# Patient Record
Sex: FEMALE | Race: BLACK OR AFRICAN AMERICAN | Employment: FULL TIME | URBAN - METROPOLITAN AREA
[De-identification: names, ages, dates, MRNs, and addresses within clinical notes are randomized per-mention and may not be internally consistent; named-entity substitution may affect disease eponyms.]

---

## 2017-05-15 ENCOUNTER — OFFICE VISIT (OUTPATIENT)
Dept: FAMILY MEDICINE CLINIC | Age: 22
End: 2017-05-15

## 2017-05-15 VITALS
RESPIRATION RATE: 12 BRPM | WEIGHT: 141 LBS | HEART RATE: 94 BPM | HEIGHT: 63 IN | DIASTOLIC BLOOD PRESSURE: 68 MMHG | TEMPERATURE: 98.1 F | BODY MASS INDEX: 24.98 KG/M2 | OXYGEN SATURATION: 99 % | SYSTOLIC BLOOD PRESSURE: 100 MMHG

## 2017-05-15 DIAGNOSIS — R55 SYNCOPE, UNSPECIFIED SYNCOPE TYPE: Primary | ICD-10-CM

## 2017-05-15 NOTE — PROGRESS NOTES
Patient c/o feeling weak for a long while.  She is asking for referral to orthopedic for coccyx pain

## 2017-05-15 NOTE — PATIENT INSTRUCTIONS
Lightheadedness or Faintness: Care Instructions  Your Care Instructions  Lightheadedness is a feeling that you are about to faint or \"pass out. \" You do not feel as if you or your surroundings are moving. It is different from vertigo, which is the feeling that you or things around you are spinning or tilting. Lightheadedness usually goes away or gets better when you lie down. If lightheadedness gets worse, it can lead to a fainting spell. It is common to feel lightheaded from time to time. Lightheadedness usually is not caused by a serious problem. It often is caused by a short-lasting drop in blood pressure and blood flow to your head that occurs when you get up too quickly from a seated or lying position. Follow-up care is a key part of your treatment and safety. Be sure to make and go to all appointments, and call your doctor if you are having problems. It's also a good idea to know your test results and keep a list of the medicines you take. How can you care for yourself at home? · Lie down for 1 or 2 minutes when you feel lightheaded. After lying down, sit up slowly and remain sitting for 1 to 2 minutes before slowly standing up. · Avoid movements, positions, or activities that have made you lightheaded in the past.  · Get plenty of rest, especially if you have a cold or flu, which can cause lightheadedness. · Make sure you drink plenty of fluids, especially if you have a fever or have been sweating. · Do not drive or put yourself and others in danger while you feel lightheaded. When should you call for help? Call 911 anytime you think you may need emergency care. For example, call if:  · You have symptoms of a stroke. These may include:  ¨ Sudden numbness, tingling, weakness, or loss of movement in your face, arm, or leg, especially on only one side of your body. ¨ Sudden vision changes. ¨ Sudden trouble speaking. ¨ Sudden confusion or trouble understanding simple statements.   ¨ Sudden problems with walking or balance. ¨ A sudden, severe headache that is different from past headaches. · You have symptoms of a heart attack. These may include:  ¨ Chest pain or pressure, or a strange feeling in the chest.  ¨ Sweating. ¨ Shortness of breath. ¨ Nausea or vomiting. ¨ Pain, pressure, or a strange feeling in the back, neck, jaw, or upper belly or in one or both shoulders or arms. ¨ Lightheadedness or sudden weakness. ¨ A fast or irregular heartbeat. After you call 911, the  may tell you to chew 1 adult-strength or 2 to 4 low-dose aspirin. Wait for an ambulance. Do not try to drive yourself. Watch closely for changes in your health, and be sure to contact your doctor if:  · Your lightheadedness gets worse or does not get better with home care. Where can you learn more? Go to http://lourdes-parisa.info/. Enter B113 in the search box to learn more about \"Lightheadedness or Faintness: Care Instructions. \"  Current as of: May 27, 2016  Content Version: 11.2  © 2129-8623 [employer]. Care instructions adapted under license by [employer] (which disclaims liability or warranty for this information). If you have questions about a medical condition or this instruction, always ask your healthcare professional. [address] any warranty or liability for your use of this information.

## 2017-05-15 NOTE — PROGRESS NOTES
Lincoln Don is a 20 y.o. female  presents for establish care. She has sxs of weakness and light headed. She had syncopal episodes. She has had multiple episodes that require rest periods afterwards. No Known Allergies    There is no problem list on file for this patient. History reviewed. No pertinent past medical history. Social History     Social History    Marital status: UNKNOWN     Spouse name: N/A    Number of children: N/A    Years of education: N/A     Social History Main Topics    Smoking status: Former Smoker    Smokeless tobacco: None    Alcohol use No    Drug use: None    Sexual activity: Not Asked     Other Topics Concern    None     Social History Narrative    None     Family History   Problem Relation Age of Onset    Hypertension Mother     Heart Disease Mother     Hypertension Maternal Grandmother     Diabetes Maternal Grandfather         Review of Systems   Constitutional: Negative for chills, diaphoresis, fever and malaise/fatigue. Cardiovascular: Positive for palpitations. Negative for chest pain. Neurological: Positive for tingling and loss of consciousness. Negative for dizziness, sensory change, speech change, focal weakness, weakness and headaches. Vitals:    05/15/17 0857   BP: 100/68   Pulse: 94   Resp: 12   Temp: 98.1 °F (36.7 °C)   TempSrc: Oral   SpO2: 99%   Weight: 141 lb (64 kg)   Height: 5' 3\" (1.6 m)   PainSc:   0 - No pain   LMP: 05/08/2017       Physical Exam   Constitutional: She is oriented to person, place, and time and well-developed, well-nourished, and in no distress. Eyes: Conjunctivae are normal. Pupils are equal, round, and reactive to light. Neck: Normal range of motion. Neck supple. Cardiovascular: Normal rate, regular rhythm and normal heart sounds. Pulmonary/Chest: Effort normal and breath sounds normal.   Neurological: She is alert and oriented to person, place, and time. Gait normal.   Skin: Skin is warm and dry. Psychiatric: Mood, memory, affect and judgment normal.   Nursing note and vitals reviewed. Assessment/Plan      ICD-10-CM ICD-9-CM    1. Syncope, unspecified syncope type R55 780.2 REFERRAL TO NEUROLOGY      REFERRAL TO CARDIOLOGY      CBC WITH AUTOMATED DIFF      METABOLIC PANEL, COMPREHENSIVE      TSH 3RD GENERATION      VITAMIN D, 25 HYDROXY     I have discussed the diagnosis with the patient and the intended plan of care as seen in the above orders. The patient has received an after-visit summary and questions were answered concerning future plans. I have discussed medication, side effects, and warnings with the patient in detail. The patient verbalized understanding and is in agreement with the plan of care. The patient will contact the office with any additional concerns. Follow-up Disposition:  Return in about 3 weeks (around 6/5/2017).   lab results and schedule of future lab studies reviewed with patient    Todd Agudelo MD

## 2017-05-15 NOTE — MR AVS SNAPSHOT
Visit Information Date & Time Provider Department Dept. Phone Encounter #  
 5/15/2017  8:45 AM Luz Christianson, [address] [id number] Follow-up Instructions Return in about 3 weeks (around 6/5/2017). Upcoming Health Maintenance Date Due  
 HPV AGE 9Y-34Y (1 of 3 - Female 3 Dose Series) 5/3/2005 DTaP/Tdap/Td series (1 - Tdap) 5/3/2015 PAP AKA CERVICAL CYTOLOGY 5/3/2015 INFLUENZA AGE 9 TO ADULT 8/1/2017 Allergies as of 5/15/2017  Review Complete On: 5/15/2017 By: Luz Christianson MD  
 No Known Allergies Current Immunizations  Never Reviewed No immunizations on file. Not reviewed this visit You Were Diagnosed With   
  
 Codes Comments Syncope, unspecified syncope type    -  Primary ICD-10-CM: R55 
ICD-9-CM: 780. 2 Vitals BP Pulse Temp Resp Height(growth percentile) Weight(growth percentile) 100/68 (BP 1 Location: Left arm, BP Patient Position: Sitting) 94 98.1 °F (36.7 °C) (Oral) 12 5' 3\" (1.6 m) 141 lb (64 kg) LMP SpO2 BMI OB Status Smoking Status 05/08/2017 99% 24.98 kg/m2 Having regular periods Former Smoker Vitals History BMI and BSA Data Body Mass Index Body Surface Area 24.98 kg/m 2 1.69 m 2 Preferred Pharmacy Pharmacy Name Phone [id number] [address] [phone] Your Updated Medication List  
  
Notice  As of 5/15/2017  9:18 AM  
 You have not been prescribed any medications. We Performed the Following REFERRAL TO CARDIOLOGY [EHK12 Custom] Comments:  
 Please evaluate patient for syncopal episodes. Please do tilt table. REFERRAL TO NEUROLOGY [XKA28 Custom] Comments:  
 Please evaluate patient for possible seizures. Follow-up Instructions Return in about 3 weeks (around 6/5/2017). To-Do List   
 05/15/2017 Lab:  CBC WITH AUTOMATED DIFF   
  
 05/15/2017 Lab: METABOLIC PANEL, COMPREHENSIVE   
  
 05/15/2017 Lab:  TSH 3RD GENERATION   
  
 05/15/2017 Lab:  VITAMIN D, 25 HYDROXY Referral Information Referral ID Referred By Referred To  
  
 [id number] Praveen Lobo, [address] MD   
   [address] Ayleen Santana Phone: [phone] Fax: [phone] Visits Status Start Date End Date 1 New Request 5/15/17 5/15/18 If your referral has a status of pending review or denied, additional information will be sent to support the outcome of this decision. Referral ID Referred By Referred To  
 [id number] Praveen Lobo, [address] [place], [place] [address]e Cherelle Alas MD  
   [address] [place] [address] [address], [address] [facility]  Phone: [phone] Fax: [phone] Visits Status Start Date End Date 1 New Request 5/15/17 5/15/18 If your referral has a status of pending review or denied, additional information will be sent to support the outcome of this decision. Patient Instructions Lightheadedness or Faintness: Care Instructions Your Care Instructions Lightheadedness is a feeling that you are about to faint or \"pass out. \" You do not feel as if you or your surroundings are moving. It is different from vertigo, which is the feeling that you or things around you are spinning or tilting. Lightheadedness usually goes away or gets better when you lie down. If lightheadedness gets worse, it can lead to a fainting spell. It is common to feel lightheaded from time to time. Lightheadedness usually is not caused by a serious problem. It often is caused by a short-lasting drop in blood pressure and blood flow to your head that occurs when you get up too quickly from a seated or lying position. Follow-up care is a key part of your treatment and safety.  Be sure to make and go to all appointments, and call your doctor if you are having problems. It's also a good idea to know your test results and keep a list of the medicines you take. How can you care for yourself at home? · Lie down for 1 or 2 minutes when you feel lightheaded. After lying down, sit up slowly and remain sitting for 1 to 2 minutes before slowly standing up. · Avoid movements, positions, or activities that have made you lightheaded in the past. 
· Get plenty of rest, especially if you have a cold or flu, which can cause lightheadedness. · Make sure you drink plenty of fluids, especially if you have a fever or have been sweating. · Do not drive or put yourself and others in danger while you feel lightheaded. When should you call for help? Call 911 anytime you think you may need emergency care. For example, call if: 
· You have symptoms of a stroke. These may include: 
¨ Sudden numbness, tingling, weakness, or loss of movement in your face, arm, or leg, especially on only one side of your body. ¨ Sudden vision changes. ¨ Sudden trouble speaking. ¨ Sudden confusion or trouble understanding simple statements. ¨ Sudden problems with walking or balance. ¨ A sudden, severe headache that is different from past headaches. · You have symptoms of a heart attack. These may include: ¨ Chest pain or pressure, or a strange feeling in the chest. 
¨ Sweating. ¨ Shortness of breath. ¨ Nausea or vomiting. ¨ Pain, pressure, or a strange feeling in the back, neck, jaw, or upper belly or in one or both shoulders or arms. ¨ Lightheadedness or sudden weakness. ¨ A fast or irregular heartbeat. After you call 911, the  may tell you to chew 1 adult-strength or 2 to 4 low-dose aspirin. Wait for an ambulance. Do not try to drive yourself. Watch closely for changes in your health, and be sure to contact your doctor if: 
· Your lightheadedness gets worse or does not get better with home care. Where can you learn more? Go to http://lourdes-parisa.info/. Enter A312 in the search box to learn more about \"Lightheadedness or Faintness: Care Instructions. \" Current as of: May 27, 2016 Content Version: 11.2 © 4182-1617 [employer]. Care instructions adapted under license by [employer] (which disclaims liability or warranty for this information). If you have questions about a medical condition or this instruction, always ask your healthcare professional. [place] [address] any warranty or liability for your use of this information. Introducing [facility]! [facility] introduces Javelin patient portal. Now you can access parts of your medical record, email your doctor's office, and request medication refills online. 1. In your internet browser, go to [email or web address] 2. Click on the First Time User? Click Here link in the Sign In box. You will see the New Member Sign Up page. 3. Enter your Javelin Access Code exactly as it appears below. You will not need to use this code after youve completed the sign-up process. If you do not sign up before the expiration date, you must request a new code. · Javelin Access Code: [email or web address] Expires: 8/13/2017  9:18 AM 
 
4. Enter the last four digits of your Social Security Number (xxxx) and Date of Birth (mm/dd/yyyy) as indicated and click Submit. You will be taken to the next sign-up page. 5. Create a Javelin ID. This will be your Javelin login ID and cannot be changed, so think of one that is secure and easy to remember. 6. Create a Javelin password. You can change your password at any time. 7. Enter your Password Reset Question and Answer. This can be used at a later time if you forget your password. 8. Enter your e-mail address. You will receive e-mail notification when new information is available in [address] 9. Click Sign Up. You can now view and download portions of your medical record. 10. Click the Download Summary menu link to download a portable copy of your medical information. If you have questions, please visit the Frequently Asked Questions section of the Doctor Evidence website. Remember, Doctor Evidence is NOT to be used for urgent needs. For medical emergencies, dial 911. Now available from your iPhone and Android! Please provide this summary of care documentation to your next provider. If you have any questions after today's visit, please call [phone].

## 2017-05-16 LAB
25(OH)D3+25(OH)D2 SERPL-MCNC: 21.4 NG/ML (ref 30–100)
ALBUMIN SERPL-MCNC: 4.2 G/DL (ref 3.5–5.5)
ALBUMIN/GLOB SERPL: 1.3 {RATIO} (ref 1.2–2.2)
ALP SERPL-CCNC: 46 IU/L (ref 39–117)
ALT SERPL-CCNC: 12 IU/L (ref 0–32)
AST SERPL-CCNC: 26 IU/L (ref 0–40)
BASOPHILS # BLD AUTO: 0 X10E3/UL (ref 0–0.2)
BASOPHILS NFR BLD AUTO: 1 %
BILIRUB SERPL-MCNC: 0.3 MG/DL (ref 0–1.2)
BUN SERPL-MCNC: 10 MG/DL (ref 6–20)
BUN/CREAT SERPL: 13 (ref 9–23)
CALCIUM SERPL-MCNC: 9.6 MG/DL (ref 8.7–10.2)
CHLORIDE SERPL-SCNC: 102 MMOL/L (ref 96–106)
CO2 SERPL-SCNC: 25 MMOL/L (ref 18–29)
CREAT SERPL-MCNC: 0.78 MG/DL (ref 0.57–1)
EOSINOPHIL # BLD AUTO: 0.1 X10E3/UL (ref 0–0.4)
EOSINOPHIL NFR BLD AUTO: 1 %
ERYTHROCYTE [DISTWIDTH] IN BLOOD BY AUTOMATED COUNT: 13.8 % (ref 12.3–15.4)
GLOBULIN SER CALC-MCNC: 3.2 G/DL (ref 1.5–4.5)
GLUCOSE SERPL-MCNC: 60 MG/DL (ref 65–99)
HCT VFR BLD AUTO: 39.3 % (ref 34–46.6)
HGB BLD-MCNC: 12.8 G/DL (ref 11.1–15.9)
IMM GRANULOCYTES # BLD: 0 X10E3/UL (ref 0–0.1)
IMM GRANULOCYTES NFR BLD: 0 %
LYMPHOCYTES # BLD AUTO: 1.9 X10E3/UL (ref 0.7–3.1)
LYMPHOCYTES NFR BLD AUTO: 29 %
MCH RBC QN AUTO: 30.5 PG (ref 26.6–33)
MCHC RBC AUTO-ENTMCNC: 32.6 G/DL (ref 31.5–35.7)
MCV RBC AUTO: 94 FL (ref 79–97)
MONOCYTES # BLD AUTO: 0.4 X10E3/UL (ref 0.1–0.9)
MONOCYTES NFR BLD AUTO: 5 %
NEUTROPHILS # BLD AUTO: 4.2 X10E3/UL (ref 1.4–7)
NEUTROPHILS NFR BLD AUTO: 64 %
PLATELET # BLD AUTO: 264 X10E3/UL (ref 150–379)
POTASSIUM SERPL-SCNC: 4.9 MMOL/L (ref 3.5–5.2)
PROT SERPL-MCNC: 7.4 G/DL (ref 6–8.5)
RBC # BLD AUTO: 4.2 X10E6/UL (ref 3.77–5.28)
SODIUM SERPL-SCNC: 141 MMOL/L (ref 134–144)
TSH SERPL DL<=0.005 MIU/L-ACNC: 2.81 UIU/ML (ref 0.45–4.5)
WBC # BLD AUTO: 6.6 X10E3/UL (ref 3.4–10.8)

## 2017-06-19 ENCOUNTER — OFFICE VISIT (OUTPATIENT)
Dept: NEUROLOGY | Age: 22
End: 2017-06-19

## 2017-06-19 VITALS
BODY MASS INDEX: 24.49 KG/M2 | HEIGHT: 63 IN | WEIGHT: 138.2 LBS | TEMPERATURE: 99 F | RESPIRATION RATE: 12 BRPM | SYSTOLIC BLOOD PRESSURE: 112 MMHG | HEART RATE: 84 BPM | OXYGEN SATURATION: 99 % | DIASTOLIC BLOOD PRESSURE: 76 MMHG

## 2017-06-19 DIAGNOSIS — G40.209 PARTIAL SYMPTOMATIC EPILEPSY WITH COMPLEX PARTIAL SEIZURES, NOT INTRACTABLE, WITHOUT STATUS EPILEPTICUS (HCC): Primary | ICD-10-CM

## 2017-06-19 DIAGNOSIS — G43.019 INTRACTABLE MIGRAINE WITHOUT AURA AND WITHOUT STATUS MIGRAINOSUS: ICD-10-CM

## 2017-06-19 DIAGNOSIS — G40.209 PARTIAL SYMPTOMATIC EPILEPSY WITH COMPLEX PARTIAL SEIZURES, NOT INTRACTABLE, WITHOUT STATUS EPILEPTICUS (HCC): ICD-10-CM

## 2017-06-19 NOTE — LETTER
6/19/2017 9:12 AM 
 
Patient:  Dorie Montero YOB: 1995 Date of Visit: 6/19/2017 Dear Verena Gaitan MD 
[address] [place] [address] [address] [place] [id number] VIA In Basket 
 : Thank you for referring Ms. Dorie Montero to me for evaluation/treatment. Below are the relevant portions of my assessment and plan of care. Dorie Montero is a 20 y.o., left handed female, with no past medical history, who has been having spells of tingling of her entire body and then becomes faint. She says that a typical trigger would be getting too hot, or exposure to significant odors. She's had at least one episode in a nail salon where she felt lightheaded and then just stops being able to move. She then develops tingling of her body. Soon there after she becomes poorly responsive and just lies on the floor. She's not aware she's had any seizure like activity, but a nurse that witnessed the event suggested she may have had a seizure. She's had 5 events that she's aware of in her life. After she's done with the event she feels very tired, as if she can sleep the rest of the day. These spells started when she was about 24years old. She's had 4 events in the last year. One of the events did not involve any trigger. There's no seizure history in the family. The patient's never had a seizure, even as an infant. She's been getting headaches daily since moving to this region in October. She describes a varying headache, some days it just a mild pressure sensation, other days they can be a throbbing bitemporal headache. There's some nausea but no vomiting. She rarely if ever got headaches when living in [place]. Social History; she's single, lives with her sister. She doesn't smoke, drink nor use illicit drugs. Works in retail. Family History; mother with hypertension and heart disease. Father with no history. Siblings with no health issues. No past medical history on file. No past surgical history on file. No Known Allergies There is no problem list on file for this patient. Review of Systems: As above otherwise 11 point review of systems negative including;  
Constitutional no fever or chills Skin denies rash or itching HENT  Denies tinnitus, hearing lose Eyes denies diplopia vision lose Respiratory denies shortness of breath Cardiovascular denies chest pain, dyspnea on exertion Gastrointestinal denies nausea, vomiting, diarrhea, constipation Genitourinary denies incontinence Musculoskeletal denies joint pain or swelling Endocrine denies weight change Hematology denies easy bruising or bleeding Neurological as above in HPI PHYSICAL EXAMINATION:   
 
VITAL SIGNS:   
Visit Vitals  /76 (BP 1 Location: Right arm, BP Patient Position: Standing)  Pulse 84  Temp 99 °F (37.2 °C) (Oral)  Resp 12  Ht 5' 3\" (1.6 m)  Wt 62.7 kg (138 lb 3.2 oz)  LMP 06/05/2017 (Exact Date)  SpO2 99%  BMI 24.48 kg/m2 BP laying was 112/78 pulse was 62 BP standing was 112/76 pulse was 84 GENERAL: The patient is well developed, well nourished, and in no apparent distress. EXTREMITIES: No clubbing, cyanosis, or edema is identified. Pulses 2+ and symmetrical.  Muscle tone is normal. 
HEAD:   Ear, nose, and throat appear to be without trauma. The patient is normocephalic. NEUROLOGIC EXAMINATION 
 
MENTAL STATUS: The patient is awake, alert, and oriented x 4. Fund of knowledge is adequate. Speech is fluent and memory appears to be intact, both long and short term. CRANIAL NERVES: II  Visual fields are full to confrontation. Funduscopic examination reveals flat disks bilaterally. Pupils are both 5 mm and briskly reactive to light and accommodation. III, IV, VI  Extraocular movements are intact and there is no nystagmus. V  Facial sensation is intact to pinprick and light touch. VII  Face is symmetrical.  
VIII - Hearing is present. IX, X, [address] rises symmetrically. Gag is present. Tongue is in the midline. XI - Shoulder shrugging and head turning intact MOTOR:  The patient is 5/5 in all four limbs without any drift. Fine finger movements are symmetrical.  Isolated motor group testing reveals no focal abnormalities. Tone is normal.  Sensory examination is intact to pinprick, light touch and position sense testing. Reflexes are 2+ and symmetrical. Plantars are down going. Cerebellar examination reveals no gross ataxia or dysmetria. Gait is normal and the patient can tandem walk without any difficulty. CBC:  
Lab Results Component Value Date/Time WBC 6.6 05/15/2017 09:18 AM  
 RBC 4.20 05/15/2017 09:18 AM  
 HGB 12.8 05/15/2017 09:18 AM  
 HCT 39.3 05/15/2017 09:18 AM  
 PLATELET 085 08/33/3372 09:18 AM  
 
BMP:  
Lab Results Component Value Date/Time Glucose 60 05/15/2017 09:18 AM  
 Sodium 141 05/15/2017 09:18 AM  
 Potassium 4.9 05/15/2017 09:18 AM  
 Chloride 102 05/15/2017 09:18 AM  
 CO2 25 05/15/2017 09:18 AM  
 BUN 10 05/15/2017 09:18 AM  
 Creatinine 0.78 05/15/2017 09:18 AM  
 Calcium 9.6 05/15/2017 09:18 AM  
 
CMP:  
Lab Results Component Value Date/Time Glucose 60 05/15/2017 09:18 AM  
 Sodium 141 05/15/2017 09:18 AM  
 Potassium 4.9 05/15/2017 09:18 AM  
 Chloride 102 05/15/2017 09:18 AM  
 CO2 25 05/15/2017 09:18 AM  
 BUN 10 05/15/2017 09:18 AM  
 Creatinine 0.78 05/15/2017 09:18 AM  
 Calcium 9.6 05/15/2017 09:18 AM  
 BUN/Creatinine ratio 13 05/15/2017 09:18 AM  
 Alk. phosphatase 46 05/15/2017 09:18 AM  
 Protein, total 7.4 05/15/2017 09:18 AM  
 Albumin 4.2 05/15/2017 09:18 AM  
 A-G Ratio 1.3 05/15/2017 09:18 AM  
 
Coagulation: No results found for: PTP, INR, APTT, PTTT Cardiac markers: No results found for: CPK, CKND1, NICKY Impression: New onset of what sounds like complex partial seizures in this patient who has risk factors including none. She has no past history and no family history. She has a normal exam.  She also has recent onset of headaches since moving into the [address] Plan: Needs an MRI brain and an EEG for new onset of what sounds like seizures. Will determine what anti-convulsant to use after the EEG is obtained. Return here in about 3 weeks. In terms of the headaches, would likely pick an anti-convulsant that will also help with the headaches. If you have questions, please do not hesitate to call me. I look forward to following Ms. Gio Ruggiero along with you.  
 
 
 
Sincerely, 
 
 
Juan Pratt MD

## 2017-06-19 NOTE — PROGRESS NOTES
Sameer Forman is a 20 y.o., left handed female, with no past medical history, who has been having spells of tingling of her entire body and then becomes faint. She says that a typical trigger would be getting too hot, or exposure to significant odors. She's had at least one episode in a nail salon where she felt lightheaded and then just stops being able to move. She then develops tingling of her body. Soon there after she becomes poorly responsive and just lies on the floor. She's not aware she's had any seizure like activity, but a nurse that witnessed the event suggested she may have had a seizure. She's had 5 events that she's aware of in her life. After she's done with the event she feels very tired, as if she can sleep the rest of the day. These spells started when she was about 24years old. She's had 4 events in the last year. One of the events did not involve any trigger. There's no seizure history in the family. The patient's never had a seizure, even as an infant. She's been getting headaches daily since moving to this region in October. She describes a varying headache, some days it just a mild pressure sensation, other days they can be a throbbing bitemporal headache. There's some nausea but no vomiting. She rarely if ever got headaches when living in [place]. Social History; she's single, lives with her sister. She doesn't smoke, drink nor use illicit drugs. Works in retail. Family History; mother with hypertension and heart disease. Father with no history. Siblings with no health issues. No past medical history on file. No past surgical history on file. No Known Allergies    There is no problem list on file for this patient.         Review of Systems:   As above otherwise 11 point review of systems negative including;   Constitutional no fever or chills  Skin denies rash or itching  HENT  Denies tinnitus, hearing lose  Eyes denies diplopia vision lose  Respiratory denies shortness of breath  Cardiovascular denies chest pain, dyspnea on exertion  Gastrointestinal denies nausea, vomiting, diarrhea, constipation  Genitourinary denies incontinence  Musculoskeletal denies joint pain or swelling  Endocrine denies weight change  Hematology denies easy bruising or bleeding   Neurological as above in HPI      PHYSICAL EXAMINATION:      VITAL SIGNS:    Visit Vitals    /76 (BP 1 Location: Right arm, BP Patient Position: Standing)    Pulse 84    Temp 99 °F (37.2 °C) (Oral)    Resp 12    Ht 5' 3\" (1.6 m)    Wt 62.7 kg (138 lb 3.2 oz)    LMP 06/05/2017 (Exact Date)    SpO2 99%    BMI 24.48 kg/m2     BP laying was 112/78 pulse was 62  BP standing was 112/76 pulse was 84    GENERAL: The patient is well developed, well nourished, and in no apparent distress. EXTREMITIES: No clubbing, cyanosis, or edema is identified. Pulses 2+ and symmetrical.  Muscle tone is normal.  HEAD:   Ear, nose, and throat appear to be without trauma. The patient is normocephalic. NEUROLOGIC EXAMINATION    MENTAL STATUS: The patient is awake, alert, and oriented x 4. Fund of knowledge is adequate. Speech is fluent and memory appears to be intact, both long and short term. CRANIAL NERVES: II - Visual fields are full to confrontation. Funduscopic examination reveals flat disks bilaterally. Pupils are both 5 mm and briskly reactive to light and accommodation. III, IV, VI - Extraocular movements are intact and there is no nystagmus. V - Facial sensation is intact to pinprick and light touch. VII - Face is symmetrical.   VIII - Hearing is present. IX, X, XII- Palate rises symmetrically. Gag is present. Tongue is in the midline. XI - Shoulder shrugging and head turning intact  MOTOR:  The patient is 5/5 in all four limbs without any drift. Fine finger movements are symmetrical.  Isolated motor group testing reveals no focal abnormalities.   Tone is normal.  Sensory examination is intact to pinprick, light touch and position sense testing. Reflexes are 2+ and symmetrical. Plantars are down going. Cerebellar examination reveals no gross ataxia or dysmetria. Gait is normal and the patient can tandem walk without any difficulty. CBC:   Lab Results   Component Value Date/Time    WBC 6.6 05/15/2017 09:18 AM    RBC 4.20 05/15/2017 09:18 AM    HGB 12.8 05/15/2017 09:18 AM    HCT 39.3 05/15/2017 09:18 AM    PLATELET 283 08/28/3339 09:18 AM     BMP:   Lab Results   Component Value Date/Time    Glucose 60 05/15/2017 09:18 AM    Sodium 141 05/15/2017 09:18 AM    Potassium 4.9 05/15/2017 09:18 AM    Chloride 102 05/15/2017 09:18 AM    CO2 25 05/15/2017 09:18 AM    BUN 10 05/15/2017 09:18 AM    Creatinine 0.78 05/15/2017 09:18 AM    Calcium 9.6 05/15/2017 09:18 AM     CMP:   Lab Results   Component Value Date/Time    Glucose 60 05/15/2017 09:18 AM    Sodium 141 05/15/2017 09:18 AM    Potassium 4.9 05/15/2017 09:18 AM    Chloride 102 05/15/2017 09:18 AM    CO2 25 05/15/2017 09:18 AM    BUN 10 05/15/2017 09:18 AM    Creatinine 0.78 05/15/2017 09:18 AM    Calcium 9.6 05/15/2017 09:18 AM    BUN/Creatinine ratio 13 05/15/2017 09:18 AM    Alk. phosphatase 46 05/15/2017 09:18 AM    Protein, total 7.4 05/15/2017 09:18 AM    Albumin 4.2 05/15/2017 09:18 AM    A-G Ratio 1.3 05/15/2017 09:18 AM     Coagulation: No results found for: PTP, INR, APTT, PTTT  Cardiac markers: No results found for: CPK, CKND1, NICKY       Impression: New onset of what sounds like complex partial seizures in this patient who has risk factors including none. She has no past history and no family history. She has a normal exam.  She also has recent onset of headaches since moving into the [facility]. Plan: Needs an MRI brain and an EEG for new onset of what sounds like seizures. Will determine what anti-convulsant to use after the EEG is obtained. Return here in about 3 weeks.   In terms of the headaches, would likely pick an anti-convulsant that will also help with the headaches.

## 2017-06-19 NOTE — COMMUNICATION BODY
Christa Masterson is a 20 y.o., left handed female, with no past medical history, who has been having spells of tingling of her entire body and then becomes faint. She says that a typical trigger would be getting too hot, or exposure to significant odors. She's had at least one episode in a nail salon where she felt lightheaded and then just stops being able to move. She then develops tingling of her body. Soon there after she becomes poorly responsive and just lies on the floor. She's not aware she's had any seizure like activity, but a nurse that witnessed the event suggested she may have had a seizure. She's had 5 events that she's aware of in her life. After she's done with the event she feels very tired, as if she can sleep the rest of the day. These spells started when she was about 24years old. She's had 4 events in the last year. One of the events did not involve any trigger. There's no seizure history in the family. The patient's never had a seizure, even as an infant. She's been getting headaches daily since moving to this region in October. She describes a varying headache, some days it just a mild pressure sensation, other days they can be a throbbing bitemporal headache. There's some nausea but no vomiting. She rarely if ever got headaches when living in [place]. Social History; she's single, lives with her sister. She doesn't smoke, drink nor use illicit drugs. Works in retail. Family History; mother with hypertension and heart disease. Father with no history. Siblings with no health issues. No past medical history on file. No past surgical history on file. No Known Allergies    There is no problem list on file for this patient.         Review of Systems:   As above otherwise 11 point review of systems negative including;   Constitutional no fever or chills  Skin denies rash or itching  HENT  Denies tinnitus, hearing lose  Eyes denies diplopia vision lose  Respiratory denies shortness of breath  Cardiovascular denies chest pain, dyspnea on exertion  Gastrointestinal denies nausea, vomiting, diarrhea, constipation  Genitourinary denies incontinence  Musculoskeletal denies joint pain or swelling  Endocrine denies weight change  Hematology denies easy bruising or bleeding   Neurological as above in HPI      PHYSICAL EXAMINATION:      VITAL SIGNS:    Visit Vitals    /76 (BP 1 Location: Right arm, BP Patient Position: Standing)    Pulse 84    Temp 99 °F (37.2 °C) (Oral)    Resp 12    Ht 5' 3\" (1.6 m)    Wt 62.7 kg (138 lb 3.2 oz)    LMP 06/05/2017 (Exact Date)    SpO2 99%    BMI 24.48 kg/m2     BP laying was 112/78 pulse was 62  BP standing was 112/76 pulse was 84    GENERAL: The patient is well developed, well nourished, and in no apparent distress. EXTREMITIES: No clubbing, cyanosis, or edema is identified. Pulses 2+ and symmetrical.  Muscle tone is normal.  HEAD:   Ear, nose, and throat appear to be without trauma. The patient is normocephalic. NEUROLOGIC EXAMINATION    MENTAL STATUS: The patient is awake, alert, and oriented x 4. Fund of knowledge is adequate. Speech is fluent and memory appears to be intact, both long and short term. CRANIAL NERVES: II - Visual fields are full to confrontation. Funduscopic examination reveals flat disks bilaterally. Pupils are both 5 mm and briskly reactive to light and accommodation. III, IV, VI - Extraocular movements are intact and there is no nystagmus. V - Facial sensation is intact to pinprick and light touch. VII - Face is symmetrical.   VIII - Hearing is present. IX, X, XII- Palate rises symmetrically. Gag is present. Tongue is in the midline. XI - Shoulder shrugging and head turning intact  MOTOR:  The patient is 5/5 in all four limbs without any drift. Fine finger movements are symmetrical.  Isolated motor group testing reveals no focal abnormalities.   Tone is normal.  Sensory examination is intact to pinprick, light touch and position sense testing. Reflexes are 2+ and symmetrical. Plantars are down going. Cerebellar examination reveals no gross ataxia or dysmetria. Gait is normal and the patient can tandem walk without any difficulty. CBC:   Lab Results   Component Value Date/Time    WBC 6.6 05/15/2017 09:18 AM    RBC 4.20 05/15/2017 09:18 AM    HGB 12.8 05/15/2017 09:18 AM    HCT 39.3 05/15/2017 09:18 AM    PLATELET 601 41/64/9468 09:18 AM     BMP:   Lab Results   Component Value Date/Time    Glucose 60 05/15/2017 09:18 AM    Sodium 141 05/15/2017 09:18 AM    Potassium 4.9 05/15/2017 09:18 AM    Chloride 102 05/15/2017 09:18 AM    CO2 25 05/15/2017 09:18 AM    BUN 10 05/15/2017 09:18 AM    Creatinine 0.78 05/15/2017 09:18 AM    Calcium 9.6 05/15/2017 09:18 AM     CMP:   Lab Results   Component Value Date/Time    Glucose 60 05/15/2017 09:18 AM    Sodium 141 05/15/2017 09:18 AM    Potassium 4.9 05/15/2017 09:18 AM    Chloride 102 05/15/2017 09:18 AM    CO2 25 05/15/2017 09:18 AM    BUN 10 05/15/2017 09:18 AM    Creatinine 0.78 05/15/2017 09:18 AM    Calcium 9.6 05/15/2017 09:18 AM    BUN/Creatinine ratio 13 05/15/2017 09:18 AM    Alk. phosphatase 46 05/15/2017 09:18 AM    Protein, total 7.4 05/15/2017 09:18 AM    Albumin 4.2 05/15/2017 09:18 AM    A-G Ratio 1.3 05/15/2017 09:18 AM     Coagulation: No results found for: PTP, INR, APTT, PTTT  Cardiac markers: No results found for: CPK, CKND1, NICKY       Impression: New onset of what sounds like complex partial seizures in this patient who has risk factors including none. She has no past history and no family history. She has a normal exam.  She also has recent onset of headaches since moving into the [facility]. Plan: Needs an MRI brain and an EEG for new onset of what sounds like seizures. Will determine what anti-convulsant to use after the EEG is obtained. Return here in about 3 weeks.   In terms of the headaches, would likely pick an anti-convulsant that will also help with the headaches.

## 2017-06-19 NOTE — MR AVS SNAPSHOT
Visit Information Date & Time Provider Department Dept. Phone Encounter #  
 6/19/2017  8:00 AM Michelle Aguilar, [address] [id number] Follow-up Instructions Return in about 3 weeks (around 7/10/2017). Follow-up and Disposition History Upcoming Health Maintenance Date Due  
 HPV AGE 9Y-34Y (1 of 3 - Female 3 Dose Series) 5/3/2005 DTaP/Tdap/Td series (1 - Tdap) 5/3/2015 PAP AKA CERVICAL CYTOLOGY 5/3/2015 INFLUENZA AGE 9 TO ADULT 8/1/2017 Allergies as of 6/19/2017  Review Complete On: 6/19/2017 By: Jack Garcia LPN No Known Allergies Current Immunizations  Never Reviewed No immunizations on file. Not reviewed this visit You Were Diagnosed With   
  
 Codes Comments Partial symptomatic epilepsy with complex partial seizures, not intractable, without status epilepticus ([facility]    -  Primary ICD-10-CM: D71.095 ICD-9-CM: 345.40 Intractable migraine without aura and without status migrainosus     ICD-10-CM: G43.019 
ICD-9-CM: 346.11 Vitals BP Pulse Temp Resp Height(growth percentile) Weight(growth percentile) 112/76 (BP 1 Location: Right arm, BP Patient Position: Standing) 84 99 °F (37.2 °C) (Oral) 12 5' 3\" (1.6 m) 138 lb 3.2 oz (62.7 kg) LMP SpO2 BMI OB Status Smoking Status 06/05/2017 (Exact Date) 99% 24.48 kg/m2 Having regular periods Former Smoker Vitals History BMI and BSA Data Body Mass Index Body Surface Area  
 24.48 kg/m 2 1.67 m 2 Preferred Pharmacy Pharmacy Name Phone [id number], [address] [place] [phone] Your Updated Medication List  
  
Notice  As of 6/19/2017  9:33 AM  
 You have not been prescribed any medications. Follow-up Instructions Return in about 3 weeks (around 7/10/2017). To-Do List   
 06/19/2017 Lab:  CREATININE   
  
 06/19/2017   Neurology:  EEG AWAKE AND ASLEEP   
  
 06/19/2017 Imaging:  MRI BRAIN W WO CONT Patient Instructions Epilepsy: Care Instructions Your Care Instructions Epilepsy is a common condition that causes repeated seizures. The seizures are caused by bursts of electrical activity in the brain that aren't normal. Seizures may cause problems with muscle control, movement, speech, vision, or awareness. They can be scary. Epilepsy affects each person differently. Some people have only a few seizures. Others get them more often. If you know what triggers a seizure, you may be able to avoid having one. You can take medicines to control and reduce seizures. You and your doctor will need to find the right combination, schedule, and dose of medicine. This may take time and careful changes. Seizures may get worse and happen more often over time. Follow-up care is a key part of your treatment and safety. Be sure to make and go to all appointments, and call your doctor if you are having problems. It's also a good idea to know your test results and keep a list of the medicines you take. How can you care for yourself at home? · Be safe with medicines. Take your medicines exactly as prescribed. Call your doctor if you think you are having a problem with your medicine. · Make a treatment plan with your doctor. Be sure to follow your plan. · Try to identify and avoid things that may make you more likely to have a seizure. These may include: ¨ Not getting enough sleep. ¨ Using drugs or alcohol. ¨ Being emotionally stressed. ¨ Skipping meals. · Keep a record of any seizures you have. Note the date, time of day, and any details about the seizure that you can remember. Your doctor can use this information to plan or adjust your medicine or other treatment. · Be sure that any doctor treating you for another condition knows that you have epilepsy. Each doctor should know what medicines you are taking, if any. · Wear a medical ID bracelet. You can buy this at most [employer]. If you have a seizure that leaves you unconscious or unable to speak for yourself, this bracelet will let those who are treating you know that you have epilepsy. · Talk to your doctor about whether it is safe for you to do certain activities, such as drive or swim. When should you call for help? Call 911 anytime you think you may need emergency care. For example, call if: · A seizure does not stop as it normally does. · You have new symptoms such as: 
¨ Numbness, tingling, or weakness on one side of your body or face. ¨ Vision changes. ¨ Trouble speaking or thinking clearly. Call your doctor now or seek immediate medical care if: 
· You have a fever. · You have a severe headache. Watch closely for changes in your health, and be sure to contact your doctor if: · The normal pattern or features of your seizures change. Where can you learn more? Go to http://lourdes-parisa.info/. Irma Souza in the search box to learn more about \"Epilepsy: Care Instructions. \" Current as of: October 14, 2016 Content Version: 11.2 © 3090-3361 [employer]. Care instructions adapted under license by [employer] (which disclaims liability or warranty for this information). If you have questions about a medical condition or this instruction, always ask your healthcare professional. [place] [address] any warranty or liability for your use of this information. Introducing \[facility]\"" & HEALTH SERVICES! Dear Enrike: Thank you for requesting a [employer] account. Our records indicate that you already have an active [employer] account. You can access your account anytime at [email or web address] Did you know that you can access your hospital and ER discharge instructions at any time in [employer]? You can also review all of your test results from your hospital stay or ER visit. Additional Information If you have questions, please visit the Frequently Asked Questions section of the [employer] website at [email or web address] Remember, Page[employer] is NOT to be used for urgent needs. For medical emergencies, dial 911. Now available from your iPhone and Android! Please provide this summary of care documentation to your next provider. Your primary care clinician is listed as [id number] [facility]. If you have any questions after today's visit, please call [phone].

## 2017-06-19 NOTE — PATIENT INSTRUCTIONS
Epilepsy: Care Instructions  Your Care Instructions  Epilepsy is a common condition that causes repeated seizures. The seizures are caused by bursts of electrical activity in the brain that aren't normal. Seizures may cause problems with muscle control, movement, speech, vision, or awareness. They can be scary. Epilepsy affects each person differently. Some people have only a few seizures. Others get them more often. If you know what triggers a seizure, you may be able to avoid having one. You can take medicines to control and reduce seizures. You and your doctor will need to find the right combination, schedule, and dose of medicine. This may take time and careful changes. Seizures may get worse and happen more often over time. Follow-up care is a key part of your treatment and safety. Be sure to make and go to all appointments, and call your doctor if you are having problems. It's also a good idea to know your test results and keep a list of the medicines you take. How can you care for yourself at home? · Be safe with medicines. Take your medicines exactly as prescribed. Call your doctor if you think you are having a problem with your medicine. · Make a treatment plan with your doctor. Be sure to follow your plan. · Try to identify and avoid things that may make you more likely to have a seizure. These may include:  ¨ Not getting enough sleep. ¨ Using drugs or alcohol. ¨ Being emotionally stressed. ¨ Skipping meals. · Keep a record of any seizures you have. Note the date, time of day, and any details about the seizure that you can remember. Your doctor can use this information to plan or adjust your medicine or other treatment. · Be sure that any doctor treating you for another condition knows that you have epilepsy. Each doctor should know what medicines you are taking, if any. · Wear a medical ID bracelet. You can buy this at most [employer].  If you have a seizure that leaves you unconscious or unable to speak for yourself, this bracelet will let those who are treating you know that you have epilepsy. · Talk to your doctor about whether it is safe for you to do certain activities, such as drive or swim. When should you call for help? Call 911 anytime you think you may need emergency care. For example, call if:  · A seizure does not stop as it normally does. · You have new symptoms such as:  ¨ Numbness, tingling, or weakness on one side of your body or face. ¨ Vision changes. ¨ Trouble speaking or thinking clearly. Call your doctor now or seek immediate medical care if:  · You have a fever. · You have a severe headache. Watch closely for changes in your health, and be sure to contact your doctor if:  · The normal pattern or features of your seizures change. Where can you learn more? Go to http://lourdes-parisa.info/. Enrike Pineda in the search box to learn more about \"Epilepsy: Care Instructions. \"  Current as of: October 14, 2016  Content Version: 11.2  © 5638-5890 [employer]. Care instructions adapted under license by [employer] (which disclaims liability or warranty for this information). If you have questions about a medical condition or this instruction, always ask your healthcare professional. [address] any warranty or liability for your use of this information.

## 2017-07-06 ENCOUNTER — TELEPHONE (OUTPATIENT)
Dept: FAMILY MEDICINE CLINIC | Age: 22
End: 2017-07-06

## 2017-07-06 ENCOUNTER — HOSPITAL ENCOUNTER (OUTPATIENT)
Dept: MRI IMAGING | Age: 22
Discharge: HOME | End: 2017-07-06
Attending: PSYCHIATRY & NEUROLOGY
Payer: COMMERCIAL

## 2017-07-06 DIAGNOSIS — G43.019 INTRACTABLE MIGRAINE WITHOUT AURA AND WITHOUT STATUS MIGRAINOSUS: ICD-10-CM

## 2017-07-06 DIAGNOSIS — G40.209 PARTIAL SYMPTOMATIC EPILEPSY WITH COMPLEX PARTIAL SEIZURES, NOT INTRACTABLE, WITHOUT STATUS EPILEPTICUS (HCC): ICD-10-CM

## 2017-07-06 PROCEDURE — 74011250636 HC RX REV CODE- 250/636: Performed by: PSYCHIATRY & NEUROLOGY

## 2017-07-06 PROCEDURE — A9585 GADOBUTROL INJECTION: HCPCS | Performed by: PSYCHIATRY & NEUROLOGY

## 2017-07-06 PROCEDURE — 70553 MRI BRAIN STEM W/O & W/DYE: CPT

## 2017-07-06 RX ADMIN — GADOBUTROL 7.5 ML: 604.72 INJECTION INTRAVENOUS at 09:42

## 2017-07-06 NOTE — TELEPHONE ENCOUNTER
Called patient back no answer left message asking her to call the office back, she does not need referrals for her insurance has open access policy, she was scheduled with cardiology on 5/31/17 at 10:00 AM and no showed that appointment according to referral tracking notes.

## 2017-07-06 NOTE — TELEPHONE ENCOUNTER
Ms. Rudolph Schulz called requesting an insurance referral to the cardiologist, Dr. Henok Schneider. She said Dr. Monse Cool referred her and she hasn't heard anything.      [facility]   [address] [place] [address], [address] [place] [address]     [phone] Phone  [phone] Fax

## 2017-07-10 ENCOUNTER — HOSPITAL ENCOUNTER (OUTPATIENT)
Dept: NEUROLOGY | Age: 22
Discharge: HOME | End: 2017-07-10
Attending: PSYCHIATRY & NEUROLOGY
Payer: COMMERCIAL

## 2017-07-10 DIAGNOSIS — G43.019 INTRACTABLE MIGRAINE WITHOUT AURA AND WITHOUT STATUS MIGRAINOSUS: ICD-10-CM

## 2017-07-10 DIAGNOSIS — G40.209 PARTIAL SYMPTOMATIC EPILEPSY WITH COMPLEX PARTIAL SEIZURES, NOT INTRACTABLE, WITHOUT STATUS EPILEPTICUS (HCC): ICD-10-CM

## 2017-07-10 PROCEDURE — 95819 EEG AWAKE AND ASLEEP: CPT

## 2017-07-10 NOTE — TELEPHONE ENCOUNTER
Called patient no answer left message letting patient know I was calling her in reference to cardiology referral asked that she call the office back, office number has been left.

## 2017-07-12 NOTE — TELEPHONE ENCOUNTER
Called patient to discuss her referral no answer left message letting patient know I was returning call to discuss her Cardiology referral no answer left message letting her know I was again returning her call to discuss this referral told to call the office back with any questions, office number has been left.

## 2017-07-12 NOTE — TELEPHONE ENCOUNTER
Patient called the office back had her verify her  she has been made aware that her insurance is an open access policy and does not require insurance referrals she was also made aware she was scheduled with Cardiology on 17 and no showed that appointment she states she did not no show the appointment she cancelled it. Told patient I will re fax a consult request to Dr. Farshad Pepe office so someone can contact her to set up another appointment she has expressed understanding and agrees with this plan consult request has been faxed.

## 2017-07-12 NOTE — PROCEDURES
[facility]    Name:  Harley Lombard  MR#:  [id number]  :  1995  Account #:  [de-identified]  Date of Adm:  07/10/2017  Date of Service:  07/10/2017      ELECTROENCEPHALOGRAM NUMBER: . CLINICAL:  This is an apparently a wakeful, drowsy and sleep EEG on  this 20year-old patient being evaluated for possible seizures. She has  been having spells of tingling in her entire body and then becomes  faint. She says that she has significant triggers for this including odors  and exposure to heat. She actually had a witnessed seizures while in a  beauty salon. MEDICATIONS:  Include none. ELECTROENCEPHALOGRAM REPORT:  The predominant,  apparently wakeful background consists of 30-40 microvolt, sinusoidal  and symmetrical 10-11 Hz waves which attenuate with eye opening. Bifrontal 2-3 microvolt, 16-20 Hz waves are seen which are  symmetrical in their occurrence. During what appears to be drowsiness, the posterior rhythm consists of  a mixture of 10-15 microvolt, 8-9 and 15-20 microvolt, 4-5 Hz waves. Central vertex sharp waves are identified, which are symmetrical in  their occurrence. Bitemporally, 30-40 microvolt, 4-5 Hz activity is  identified. During brief sleep, spindles are identified in the central head  region. Step-flash photic stimulation caused driving between 3 and 18 flashes  per second. Hyperventilation did not change the record. IMPRESSION:  This is a normal wakeful, drowsy and light sleep  electroencephalogram.  No epileptiform or focal abnormality was  identified.             MD Tamera Wong / Salvador Patel  D:  2017   11:53  T:  2017   01:38  Job #:  [id number]

## 2017-07-17 ENCOUNTER — OFFICE VISIT (OUTPATIENT)
Dept: CARDIOLOGY CLINIC | Age: 22
End: 2017-07-17

## 2017-07-17 VITALS
SYSTOLIC BLOOD PRESSURE: 121 MMHG | DIASTOLIC BLOOD PRESSURE: 78 MMHG | WEIGHT: 140 LBS | HEIGHT: 63 IN | BODY MASS INDEX: 24.8 KG/M2 | HEART RATE: 95 BPM

## 2017-07-17 DIAGNOSIS — R55 NEAR SYNCOPE: ICD-10-CM

## 2017-07-17 DIAGNOSIS — R00.2 PALPITATIONS: Primary | ICD-10-CM

## 2017-07-17 DIAGNOSIS — R06.02 SOB (SHORTNESS OF BREATH) ON EXERTION: ICD-10-CM

## 2017-07-17 NOTE — LETTER
Brad London 1995 7/17/2017 Dear Gabi Barnes MD 
 
I had the pleasure of evaluating  Ms. Arsalan Randle in office today. Below are the relevant portions of my assessment and plan of care. ICD-10-CM ICD-9-CM 1. Palpitations R00.2 785.1 AMB POC EKG ROUTINE W/ 12 LEADS, INTER & REP 2. Near syncope R55 780.2 2D ECHO COMPLETE ADULT (TTE) TILT TABLE EVALUATION  
 likely vagal/neurocardiogenic; +POTS 
plenty of fluids 3. SOB (shortness of breath) on exertion R06.02 786.05 2D ECHO COMPLETE ADULT (TTE) Orders Placed This Encounter  2D ECHO COMPLETE ADULT (TTE) Standing Status:   Future Standing Expiration Date:   1/13/2018 Order Specific Question:   Reason for Exam: Answer:   Blaine Heaton  TILT TABLE EVALUATION Standing Status:   Future Standing Expiration Date:   1/13/2018 Order Specific Question:   Reason for Exam: Answer:   syncope  AMB POC EKG ROUTINE W/ 12 LEADS, INTER & REP Order Specific Question:   Reason for Exam: Answer:   palpitations If you have questions, please do not hesitate to call me. I look forward to following Ms. Arsalan Randle along with you. Sincerely, Sharath Stallworth MD

## 2017-07-17 NOTE — PATIENT INSTRUCTIONS
There are no discontinued medications. Orders Placed This Encounter    2D ECHO COMPLETE ADULT (TTE)     Standing Status:   Future     Standing Expiration Date:   1/13/2018     Order Specific Question:   Reason for Exam:     Answer:   sob    TILT TABLE EVALUATION     Standing Status:   Future     Standing Expiration Date:   1/13/2018     Order Specific Question:   Reason for Exam:     Answer:   syncope    AMB POC EKG ROUTINE W/ 12 LEADS, INTER & REP     Order Specific Question:   Reason for Exam:     Answer:   palpitations          Fainting: Care Instructions  Your Care Instructions    When you faint, or pass out, you lose consciousness for a short time. A brief drop in blood flow to the brain often causes it. When you fall or lie down, more blood flows to your brain and you regain consciousness. Emotional stress, pain, or overheatingespecially if you have been standingcan make you faint. In these cases, fainting is usually not serious. But fainting can be a sign of a more serious problem. Your doctor may want you to have more tests to rule out other causes. The treatment you need depends on the reason why you fainted. The doctor has checked you carefully, but problems can develop later. If you notice any problems or new symptoms, get medical treatment right away. Follow-up care is a key part of your treatment and safety. Be sure to make and go to all appointments, and call your doctor if you are having problems. It's also a good idea to know your test results and keep a list of the medicines you take. How can you care for yourself at home? · Drink plenty of fluids to prevent dehydration. If you have kidney, heart, or liver disease and have to limit fluids, talk with your doctor before you increase your fluid intake. When should you call for help? Call 911 anytime you think you may need emergency care. For example, call if:  · You have symptoms of a heart problem.  These may include:  ¨ Chest pain or pressure. ¨ Severe trouble breathing. ¨ A fast or irregular heartbeat. ¨ Lightheadedness or sudden weakness. ¨ Coughing up pink, foamy mucus. ¨ Passing out. After you call 911, the  may tell you to chew 1 adult-strength or 2 to 4 low-dose aspirin. Wait for an ambulance. Do not try to drive yourself. · You have symptoms of a stroke. These may include:  ¨ Sudden numbness, tingling, weakness, or loss of movement in your face, arm, or leg, especially on only one side of your body. ¨ Sudden vision changes. ¨ Sudden trouble speaking. ¨ Sudden confusion or trouble understanding simple statements. ¨ Sudden problems with walking or balance. ¨ A sudden, severe headache that is different from past headaches. · You passed out (lost consciousness) again. Watch closely for changes in your health, and be sure to contact your doctor if:  · You do not get better as expected. Where can you learn more? Go to http://lourdes-parisa.info/. Enter K160 in the search box to learn more about \"Fainting: Care Instructions. \"  Current as of: March 20, 2017  Content Version: 11.3  © 9505-1671 [employer]. Care instructions adapted under license by [employer] (which disclaims liability or warranty for this information). If you have questions about a medical condition or this instruction, always ask your healthcare professional. [place] [address] any warranty or liability for your use of this information.

## 2017-07-17 NOTE — MR AVS SNAPSHOT
Visit Information Date & Time Provider Department Dept. Phone Encounter #  
 7/17/2017 11:15 AM Nava Kaba MD [facility] [address] [place] [address] [id number] Follow-up Instructions Return in about 6 weeks (around 8/28/2017), or if symptoms worsen or fail to improve, for post test.  
  
Your Appointments 7/25/2017  4:00 PM  
Follow Up with Sebastian Harper MD  
[address]) Appt Note: 3 week follow up MRI, EEG  
 [address] [facility] [address] [place] [address]  
[phone]  
  
   
 [place] [id number]  
  
    
 7/31/2017 10:30 AM  
ROUTINE CARE with Josselin Coon MD  
[facility] ([address]) Appt Note: f/u after neuro and cardiology appts [facility] [address] [facility] [place] [address]  
[phone]  
  
   
 [address] [address]  
  
    
 8/9/2017 10:00 AM  
PROCEDURE with [place] ECHO [facility] [place] ([address]) Appt Note: echo [place] [address] [employer]", [address] [place] [address]  
[address], [address] [address]  
  
    
 8/28/2017 10:45 AM  
Office Visit with Nava Kaba MD  
[facility]) Appt Note: post echo and tilt [address] [facility], [address] [facility] [address]  
[address], [address] [place] [id number] [address] Upcoming Health Maintenance Date Due  
 HPV AGE 9Y-34Y (1 of 3 - Female 3 Dose Series) 5/3/2005 DTaP/Tdap/Td series (1 - Tdap) 5/3/2015 PAP AKA CERVICAL CYTOLOGY 5/3/2015 INFLUENZA AGE 9 TO ADULT 8/1/2017 Allergies as of 7/17/2017  Review Complete On: 7/17/2017 By: Nava Kaba MD  
 No Known Allergies Current Immunizations  Never Reviewed No immunizations on file. Not reviewed this visit You Were Diagnosed With   
  
 Codes Comments Palpitations    -  Primary ICD-10-CM: R00.2 ICD-9-CM: 785.1 Near syncope     ICD-10-CM: R55 
ICD-9-CM: 780.2 likely vagal/neurocardiogenic; +POTS 
plenty of fluids SOB (shortness of breath) on exertion     ICD-10-CM: R06.02 
ICD-9-CM: 786.05 Vitals BP Pulse Height(growth percentile) Weight(growth percentile) LMP BMI  
 121/78 (BP 1 Location: Right arm, BP Patient Position: Standing) 95 5' 3\" (1.6 m) 140 lb (63.5 kg) 06/05/2017 (Exact Date) 24.8 kg/m2 OB Status Smoking Status Having regular periods Never Smoker Vitals History BMI and BSA Data Body Mass Index Body Surface Area  
 24.8 kg/m 2 1.68 m 2 Preferred Pharmacy Pharmacy Name Phone [id number], [address] [place] [phone] Your Updated Medication List  
  
Notice  As of 7/17/2017 11:49 AM  
 You have not been prescribed any medications. We Performed the Following AMB POC EKG ROUTINE W/ 12 LEADS, INTER & REP [67981 CPT(R)] Follow-up Instructions Return in about 6 weeks (around 8/28/2017), or if symptoms worsen or fail to improve, for post test.  
  
To-Do List   
 Around 07/20/2017 Cardiac Services:  2D ECHO COMPLETE ADULT (TTE) Around 07/20/2017 Cardiac Services:  TILT TABLE EVALUATION Patient Instructions There are no discontinued medications. Orders Placed This Encounter  2D ECHO COMPLETE ADULT (TTE) Standing Status:   Future Standing Expiration Date:   1/13/2018 Order Specific Question:   Reason for Exam: Answer:   Hna Light  TILT TABLE EVALUATION Standing Status:   Future Standing Expiration Date:   1/13/2018 Order Specific Question:   Reason for Exam: Answer:   syncope  AMB POC EKG ROUTINE W/ 12 LEADS, INTER & REP Order Specific Question:   Reason for Exam: Answer:   palpitations Fainting: Care Instructions Your Care Instructions When you faint, or pass out, you lose consciousness for a short time. A brief drop in blood flow to the brain often causes it. When you fall or lie down, more blood flows to your brain and you regain consciousness. Emotional stress, pain, or overheatingespecially if you have been standingcan make you faint. In these cases, fainting is usually not serious. But fainting can be a sign of a more serious problem. Your doctor may want you to have more tests to rule out other causes. The treatment you need depends on the reason why you fainted. The doctor has checked you carefully, but problems can develop later. If you notice any problems or new symptoms, get medical treatment right away. Follow-up care is a key part of your treatment and safety. Be sure to make and go to all appointments, and call your doctor if you are having problems. It's also a good idea to know your test results and keep a list of the medicines you take. How can you care for yourself at home? · Drink plenty of fluids to prevent dehydration. If you have kidney, heart, or liver disease and have to limit fluids, talk with your doctor before you increase your fluid intake. When should you call for help? Call 911 anytime you think you may need emergency care. For example, call if: 
· You have symptoms of a heart problem. These may include: ¨ Chest pain or pressure. ¨ Severe trouble breathing. ¨ A fast or irregular heartbeat. ¨ Lightheadedness or sudden weakness. ¨ Coughing up pink, foamy mucus. ¨ Passing out. After you call 911, the  may tell you to chew 1 adult-strength or 2 to 4 low-dose aspirin. Wait for an ambulance. Do not try to drive yourself. · You have symptoms of a stroke. These may include: 
¨ Sudden numbness, tingling, weakness, or loss of movement in your face, arm, or leg, especially on only one side of your body. ¨ Sudden vision changes. ¨ Sudden trouble speaking. ¨ Sudden confusion or trouble understanding simple statements. ¨ Sudden problems with walking or balance. ¨ A sudden, severe headache that is different from past headaches. · You passed out (lost consciousness) again. Watch closely for changes in your health, and be sure to contact your doctor if: 
· You do not get better as expected. Where can you learn more? Go to http://lourdes-parisa.info/. Enter W929 in the search box to learn more about \"Fainting: Care Instructions. \" Current as of: March 20, 2017 Content Version: 11.3 © 0886-2183 [employer]. Care instructions adapted under license by [employer] (which disclaims liability or warranty for this information). If you have questions about a medical condition or this instruction, always ask your healthcare professional. [address] any warranty or liability for your use of this information. Introducing [facility]! Dear Duane Todd: Thank you for requesting a [employer] account. Our records indicate that you already have an active [employer] account. You can access your account anytime at [email or web address]. [email or web address]/[email or web address] Did you know that you can access your hospital and ER discharge instructions at any time in [employer]? You can also review all of your test results from your hospital stay or ER visit. Additional Information If you have questions, please visit the Frequently Asked Questions section of the [employer] website at [email or web address] [email or web address]/. Remember, [employer] is NOT to be used for urgent needs. For medical emergencies, dial 911. Now available from your iPhone and Android! Please provide this summary of care documentation to your next provider. Your primary care clinician is listed as [address]. If you have any questions after today's visit, please call [phone].

## 2017-07-17 NOTE — PROGRESS NOTES
1. Have you been to the ER, urgent care clinic since your last visit? Hospitalized since your last visit?    no    2. Have you seen or consulted any other health care providers outside of the [address] [place] [address] since your last visit? Include any pap smears or colon screening. Yes, pcp    3. Since your last visit, have you had any of the following symptoms? Sob sometimes, dizziness sometimes and palpitations         4. Have you had any blood work, X-rays or cardiac testing? Yes,pcp            5.  Where do you normally have your labs drawn?   pcp    6. Do you need any refills today?    no

## 2017-07-17 NOTE — PROGRESS NOTES
HISTORY OF PRESENT ILLNESS  Yani Calzada is a 20 y.o. female. New Patient   The history is provided by the patient and medical records. Associated symptoms include chest pain (randomly for 2-3 minutes) and shortness of breath. Pertinent negatives include no headaches. Shortness of Breath   The history is provided by the patient. This is a new problem. The problem occurs intermittently. The current episode started more than 1 week ago (6/16). Associated symptoms include chest pain (randomly for 2-3 minutes). Pertinent negatives include no fever, no headaches, no cough, no wheezing, no PND, no orthopnea, no vomiting, no rash and no claudication. Leg swelling: palpitat. The problem's precipitants include exercise (walking, standing). Dizziness   The history is provided by the patient and medical records. This is a new problem. The current episode started more than 1 week ago (since age 24). The problem occurs rarely (3/yr; starts with SOB, nausea, tingling in body). Associated symptoms include chest pain (randomly for 2-3 minutes) and shortness of breath. Pertinent negatives include no headaches. The symptoms are aggravated by standing (sometimes sitting). The symptoms are relieved by rest (takes a long nap and feels better but still weak). Palpitations    The history is provided by the patient. This is a new problem. The current episode started more than 1 week ago (6/16). The problem occurs rarely. Episode Length: fluttering for few heart beats. The problem is associated with exercise (walking). Associated symptoms include chest pain (randomly for 2-3 minutes), dizziness and shortness of breath. Pertinent negatives include no fever, no malaise/fatigue, no claudication, no orthopnea, no PND, no nausea, no vomiting, no headaches and no cough. Review of Systems   Constitutional: Negative for chills, fever, malaise/fatigue and weight loss. HENT: Negative for nosebleeds. Eyes: Negative for discharge. Respiratory: Positive for shortness of breath. Negative for cough and wheezing. Cardiovascular: Positive for chest pain (randomly for 2-3 minutes) and palpitations. Negative for orthopnea, claudication and PND. Leg swelling: palpitat. Gastrointestinal: Negative for diarrhea, nausea and vomiting. Genitourinary: Negative for dysuria and hematuria. Musculoskeletal: Negative for joint pain. Skin: Negative for rash. Neurological: Positive for dizziness. Negative for seizures, loss of consciousness and headaches. Endo/Heme/Allergies: Negative for polydipsia. Does not bruise/bleed easily. Psychiatric/Behavioral: Negative for depression and substance abuse. The patient does not have insomnia. No Known Allergies    Past Medical History:   Diagnosis Date    Syncope        Family History   Problem Relation Age of Onset    Heart Disease Mother     Hypertension Maternal Grandmother     Diabetes Maternal Grandfather     Heart Surgery Neg Hx     Stroke Neg Hx        Social History   Substance Use Topics    Smoking status: Never Smoker    Smokeless tobacco: Never Used    Alcohol use No        No current outpatient prescriptions on file. No current facility-administered medications for this visit. History reviewed. No pertinent surgical history. Visit Vitals    /78 (BP 1 Location: Right arm, BP Patient Position: Standing)    Pulse 95    Ht 5' 3\" (1.6 m)    Wt 63.5 kg (140 lb)    LMP 06/05/2017 (Exact Date)    BMI 24.8 kg/m2     Vitals:    07/17/17 1110 07/17/17 1131 07/17/17 1132   BP: 122/81 131/84 121/78   BP 1 Location:  Right arm Right arm   BP Patient Position:  Supine Standing   Pulse: 73 75 95   Weight: 63.5 kg (140 lb)     Height: 5' 3\" (1.6 m)           Diagnostic Studies:  I have reviewed the relevant tests done on the patient and show as follows  EKG tracings reviewed by me today. No flowsheet data found.     Ms. Maricarmen Oleary has a reminder for a \"due or due soon\" health maintenance. I have asked that she contact her primary care provider for follow-up on this health maintenance. Physical Exam   Constitutional: She is oriented to person, place, and time. She appears well-developed and well-nourished. No distress. HENT:   Head: Normocephalic and atraumatic. Mouth/Throat: Normal dentition. Eyes: Right eye exhibits no discharge. Left eye exhibits no discharge. No scleral icterus. Neck: Neck supple. No JVD present. Carotid bruit is not present. No thyromegaly present. Cardiovascular: Normal rate, regular rhythm, S1 normal, S2 normal, normal heart sounds and intact distal pulses. Exam reveals no gallop and no friction rub. No murmur heard. Pulmonary/Chest: Effort normal and breath sounds normal. She has no wheezes. She has no rales. Abdominal: Soft. She exhibits no mass. There is no tenderness. Musculoskeletal: She exhibits no edema. Lymphadenopathy:        Right cervical: No superficial cervical adenopathy present. Left cervical: No superficial cervical adenopathy present. Neurological: She is alert and oriented to person, place, and time. Skin: Skin is warm and dry. No rash noted. Psychiatric: She has a normal mood and affect. Her behavior is normal.       ASSESSMENT and Refugio Wise was seen today for new patient, shortness of breath, dizziness and irregular heart beat. Diagnoses and all orders for this visit:    Palpitations  -     AMB POC EKG ROUTINE W/ 12 LEADS, INTER & REP    Near syncope  Comments:  likely vagal/neurocardiogenic; +POTS  plenty of fluids  Orders:  -     2D ECHO COMPLETE ADULT (TTE); Future  -     TILT TABLE EVALUATION; Future    SOB (shortness of breath) on exertion  -     2D ECHO COMPLETE ADULT (TTE); Future        Pertinent laboratory and test data reviewed and discussed with patient. See patient instructions also for other medical advice given    There are no discontinued medications.     Follow-up Disposition:  Return in about 6 weeks (around 8/28/2017), or if symptoms worsen or fail to improve, for post test.

## 2017-07-26 ENCOUNTER — TELEPHONE (OUTPATIENT)
Dept: NEUROLOGY | Age: 22
End: 2017-07-26

## 2017-07-26 NOTE — TELEPHONE ENCOUNTER
Pt called. She received a letter stating her results are in and to call the office.   She does have an appointment on July 31st. [phone]

## 2017-07-26 NOTE — TELEPHONE ENCOUNTER
Informed patient that MRI was WNL per note by Dr Manuela Snyder on 7/12/17. Patient verbalized understanding of results.

## 2021-09-14 ENCOUNTER — OFFICE VISIT (OUTPATIENT)
Dept: FAMILY MEDICINE CLINIC | Age: 26
End: 2021-09-14
Payer: COMMERCIAL

## 2021-09-14 ENCOUNTER — HOSPITAL ENCOUNTER (OUTPATIENT)
Dept: LAB | Age: 26
Discharge: HOME | End: 2021-09-14

## 2021-09-14 VITALS
TEMPERATURE: 98.8 F | WEIGHT: 178 LBS | RESPIRATION RATE: 14 BRPM | SYSTOLIC BLOOD PRESSURE: 124 MMHG | BODY MASS INDEX: 31.54 KG/M2 | HEART RATE: 81 BPM | DIASTOLIC BLOOD PRESSURE: 84 MMHG | OXYGEN SATURATION: 100 % | HEIGHT: 63 IN

## 2021-09-14 DIAGNOSIS — E55.9 VITAMIN D DEFICIENCY: Primary | ICD-10-CM

## 2021-09-14 LAB — XX-LABCORP SPECIMEN COL,LCBCF: NORMAL

## 2021-09-14 PROCEDURE — 99203 OFFICE O/P NEW LOW 30 MIN: CPT | Performed by: FAMILY MEDICINE

## 2021-09-14 PROCEDURE — 99001 SPECIMEN HANDLING PT-LAB: CPT

## 2021-09-14 NOTE — PATIENT INSTRUCTIONS
Learning About Vitamin D  Why is it important to get enough vitamin D? Your body needs vitamin D to absorb calcium. Calcium keeps your bones and muscles, including your heart, healthy and strong. If your muscles don't get enough calcium, they can cramp, hurt, or feel weak. You may have long-term (chronic) muscle aches and pains. If you don't get enough vitamin D throughout life, you have an increased chance of having thin and brittle bones (osteoporosis) in your later years. Children who don't get enough vitamin D may not grow as much as others their age. They also have a chance of getting a rare disease called rickets. It causes weak bones. Vitamin D and calcium are added to many foods. And your body uses sunshine to make its own vitamin D. How much vitamin D do you need? The recommended daily allowance (RDA) for vitamin D is 600 IU (international units) every day for people ages 3 through 79. Adults 71 and older need 800 IU every day. Blood tests for vitamin D can check your vitamin D level. But there is no standard normal range used by all laboratories. You're likely getting enough vitamin D if your levels are in the range of 20 to 50 ng/mL. How can you get more vitamin D? Foods that contain vitamin D include:  · Marlow, tuna, and mackerel. These are some of the best foods to eat when you need to get more vitamin D.  · Cheese, egg yolks, and beef liver. These foods have vitamin D in small amounts. · Milk, soy drinks, orange juice, yogurt, margarine, and some kinds of cereal have vitamin D added to them. Some people don't make vitamin D as well as others. They may have to take extra care in getting enough vitamin D. Things that reduce how much vitamin D your body makes include:  · Dark skin, such as many  Americans have. · Age, especially if you are older than 72. · Digestive problems, such as Crohn's or celiac disease. · Liver and kidney disease.   Some people who do not get enough vitamin D may need supplements. Are there any risks from taking vitamin D?  · Too much vitamin D:  ? Can damage your kidneys. ? Can cause nausea and vomiting, constipation, and weakness. ? Raises the amount of calcium in your blood. If this happens, you can get confused or have an irregular heart rhythm. · Vitamin D may interact with other medicines. Tell your doctor about all of the medicines you take, including over-the-counter drugs, herbs, and pills. Tell your doctor about all of your current medical problems. Where can you learn more? Go to http://www.li.com/  Enter V530 in the search box to learn more about \"Learning About Vitamin D.\"  Current as of: December 17, 2020               Content Version: 12.8  © 2006-2021 Healthwise, [employer]. Care instructions adapted under license by [employer] (which disclaims liability or warranty for this information). If you have questions about a medical condition or this instruction, always ask your healthcare professional. [place] [address] any warranty or liability for your use of this information.

## 2021-09-14 NOTE — PROGRESS NOTES
Tierra Fitzgerald is a 31 y.o. female  presents for re establish care. No complaints. Has history of vitamin d def. No Known Allergies    Patient Active Problem List   Diagnosis Code    [facility]intractable [facility] with complex partial [facility] G40.209    Intractable migraine without aura and without status migrainosus G43.019    Palpitations R00.2     Past Medical History:   Diagnosis Date    Syncope      Social History     Socioeconomic History    Marital status: UNKNOWN     Spouse name: Not on file    Number of children: Not on file    Years of education: Not on file    Highest education level: Not on file   Tobacco Use    Smoking status: Former Smoker    Smokeless tobacco: Never Used   Vaping Use    Vaping Use: Never used   Substance and Sexual Activity    Alcohol use: Yes     Comment: Wine    Drug use: No    Sexual activity: Yes     Social Determinants of Health     Financial Resource Strain:     Difficulty of Paying Living Expenses:    Food Insecurity:     Worried About Running Out of Food in the Last Year:     [address] in the Last Year:    Transportation Needs:     Lack of Transportation (Medical):  Lack of Transportation (Non-Medical):    Physical Activity:     Days of Exercise per Week:     Minutes of Exercise per Session:    Stress:     Feeling of Stress :    Social Connections:     Frequency of Communication with Friends and Family:     Frequency of Social Gatherings with Friends and Family:     Attends [employer] Services:     Active Member of Clubs or Organizations:     Attends Club or Organization Meetings:     Marital Status:      Family History   Problem Relation Age of Onset    Heart Disease Mother     Hypertension Maternal Grandmother     Diabetes Maternal Grandfather     Heart Surgery Neg Hx     Stroke Neg Hx         Review of Systems   Constitutional: Negative for chills, fever, malaise/fatigue and weight loss. Eyes: Negative for blurred vision. Respiratory: Negative for cough, shortness of breath and wheezing. Cardiovascular: Negative for chest pain. Gastrointestinal: Negative for nausea and vomiting. Musculoskeletal: Negative for myalgias. Skin: Negative for rash. Neurological: Negative for weakness. Psychiatric/Behavioral: Negative. Vitals:    09/14/21 0855   BP: 124/84   Pulse: 81   Resp: 14   Temp: 98.8 °F (37.1 °C)   TempSrc: Oral   SpO2: 100%   Weight: 178 lb (80.7 kg)   Height: 5' 3\" (1.6 m)   PainSc:   0 - No pain       Physical Exam  Vitals and nursing note reviewed. Neck:      Thyroid: No thyromegaly. Cardiovascular:      Rate and Rhythm: Normal rate and regular rhythm. Pulses: Normal pulses. Heart sounds: Normal heart sounds. Pulmonary:      Effort: Pulmonary effort is normal.      Breath sounds: Normal breath sounds. Musculoskeletal:         General: Normal range of motion. Cervical back: Normal range of motion and neck supple. Skin:     General: Skin is warm and dry. Capillary Refill: Capillary refill takes less than 2 seconds. Neurological:      General: No focal deficit present. Mental Status: She is alert and oriented to person, place, and time. Psychiatric:         Mood and Affect: Mood normal.         Behavior: Behavior normal.         Thought Content: Thought content normal.         Judgment: Judgment normal.         Assessment/Plan      ICD-10-CM ICD-9-CM    1. Vitamin D deficiency  E55.9 268.9 VITAMIN D, 25 HYDROXY      METABOLIC PANEL, COMPREHENSIVE      CBC WITH AUTOMATED DIFF     Follow-up and Dispositions    · Return in about 6 months (around 3/14/2022). I have discussed the diagnosis with the patient and the intended plan of care as seen in the above orders. The patient has received an after-visit summary and questions were answered concerning future plans. I have discussed medication, side effects, and warnings with the patient in detail.  The patient verbalized understanding and is in agreement with the plan of care. The patient will contact the office with any additional concerns.       lab results and schedule of future lab studies reviewed with patient    Rosanne Greene MD

## 2021-09-14 NOTE — PROGRESS NOTES
Patient here to [facility] care and says she is here for her annual exam.    1. \"Have you been to the ER, urgent care clinic since your last visit? Hospitalized since your last visit? \" No    2. \"Have you seen or consulted any other health care providers outside of the [address] [place] [address] since your last visit? \" No     3. For patients over 45: Has the patient had a colonoscopy? No n/a     If the patient is female:    4. For patients over 40: Has the patient had a mammogram? No n/a    5. For patients over 21: Has the patient had a pap smear?  No

## 2021-09-15 LAB
25(OH)D3+25(OH)D2 SERPL-MCNC: 16.5 NG/ML (ref 30–100)
ALBUMIN SERPL-MCNC: 4.4 G/DL (ref 3.9–5)
ALBUMIN/GLOB SERPL: 1.4 {RATIO} (ref 1.2–2.2)
ALP SERPL-CCNC: 61 IU/L (ref 44–121)
ALT SERPL-CCNC: 7 IU/L (ref 0–32)
AST SERPL-CCNC: 9 IU/L (ref 0–40)
BASOPHILS # BLD AUTO: 0.1 X10E3/UL (ref 0–0.2)
BASOPHILS NFR BLD AUTO: 1 %
BILIRUB SERPL-MCNC: 0.3 MG/DL (ref 0–1.2)
BUN SERPL-MCNC: 12 MG/DL (ref 6–20)
BUN/CREAT SERPL: 15 (ref 9–23)
CALCIUM SERPL-MCNC: 10 MG/DL (ref 8.7–10.2)
CHLORIDE SERPL-SCNC: 102 MMOL/L (ref 96–106)
CO2 SERPL-SCNC: 26 MMOL/L (ref 20–29)
CREAT SERPL-MCNC: 0.79 MG/DL (ref 0.57–1)
EOSINOPHIL # BLD AUTO: 0.1 X10E3/UL (ref 0–0.4)
EOSINOPHIL NFR BLD AUTO: 1 %
ERYTHROCYTE [DISTWIDTH] IN BLOOD BY AUTOMATED COUNT: 13.2 % (ref 11.7–15.4)
GLOBULIN SER CALC-MCNC: 3.2 G/DL (ref 1.5–4.5)
GLUCOSE SERPL-MCNC: 92 MG/DL (ref 65–99)
HCT VFR BLD AUTO: 38.1 % (ref 34–46.6)
HGB BLD-MCNC: 12.6 G/DL (ref 11.1–15.9)
IMM GRANULOCYTES # BLD AUTO: 0 X10E3/UL (ref 0–0.1)
IMM GRANULOCYTES NFR BLD AUTO: 0 %
LYMPHOCYTES # BLD AUTO: 2.7 X10E3/UL (ref 0.7–3.1)
LYMPHOCYTES NFR BLD AUTO: 40 %
MCH RBC QN AUTO: 29.3 PG (ref 26.6–33)
MCHC RBC AUTO-ENTMCNC: 33.1 G/DL (ref 31.5–35.7)
MCV RBC AUTO: 89 FL (ref 79–97)
MONOCYTES # BLD AUTO: 0.3 X10E3/UL (ref 0.1–0.9)
MONOCYTES NFR BLD AUTO: 5 %
NEUTROPHILS # BLD AUTO: 3.7 X10E3/UL (ref 1.4–7)
NEUTROPHILS NFR BLD AUTO: 53 %
PLATELET # BLD AUTO: 313 X10E3/UL (ref 150–450)
POTASSIUM SERPL-SCNC: 4.5 MMOL/L (ref 3.5–5.2)
PROT SERPL-MCNC: 7.6 G/DL (ref 6–8.5)
RBC # BLD AUTO: 4.3 X10E6/UL (ref 3.77–5.28)
SODIUM SERPL-SCNC: 139 MMOL/L (ref 134–144)
WBC # BLD AUTO: 6.9 X10E3/UL (ref 3.4–10.8)

## 2021-09-21 NOTE — PROGRESS NOTES
Vitamin d is still low. Will continue 25860 iu a week. # 12 with one refill. She does not have a pharmacy in chart.

## 2022-03-18 PROBLEM — G40.209 NONINTRACTABLE EPILEPSY WITH COMPLEX PARTIAL SEIZURES (HCC): Status: ACTIVE | Noted: 2017-06-19

## 2022-03-18 PROBLEM — R00.2 PALPITATIONS: Status: ACTIVE | Noted: 2017-07-17

## 2022-03-20 PROBLEM — G43.019 INTRACTABLE MIGRAINE WITHOUT AURA AND WITHOUT STATUS MIGRAINOSUS: Status: ACTIVE | Noted: 2017-06-19

## 2022-06-02 ENCOUNTER — OFFICE VISIT (OUTPATIENT)
Dept: FAMILY MEDICINE CLINIC | Age: 27
End: 2022-06-02
Payer: COMMERCIAL

## 2022-06-02 VITALS
RESPIRATION RATE: 10 BRPM | DIASTOLIC BLOOD PRESSURE: 80 MMHG | SYSTOLIC BLOOD PRESSURE: 108 MMHG | BODY MASS INDEX: 32.59 KG/M2 | OXYGEN SATURATION: 98 % | WEIGHT: 184 LBS | HEART RATE: 98 BPM | TEMPERATURE: 98.1 F

## 2022-06-02 DIAGNOSIS — R20.2 TINGLING IN EXTREMITIES: ICD-10-CM

## 2022-06-02 DIAGNOSIS — E55.9 VITAMIN D DEFICIENCY: Primary | ICD-10-CM

## 2022-06-02 PROBLEM — G40.209 NONINTRACTABLE EPILEPSY WITH COMPLEX PARTIAL SEIZURES (HCC): Status: RESOLVED | Noted: 2017-06-19 | Resolved: 2022-06-02

## 2022-06-02 PROCEDURE — 99213 OFFICE O/P EST LOW 20 MIN: CPT | Performed by: FAMILY MEDICINE

## 2022-06-02 NOTE — PATIENT INSTRUCTIONS
Numbness and Tingling: Care Instructions  Your Care Instructions     Many things can cause numbness or tingling. Swelling may put pressure on a nerve. This could cause you to lose feeling or have a pins-and-needles sensation on part of your body. Nerves may be damaged from trauma, toxins, or diseases, such as diabetes or multiple sclerosis (MS). Sometimes, though, the cause is not clear. If there is no clear reason for your symptoms, and you are not having any other symptoms, your doctor may suggest watching and waiting for a while to see if the numbness or tingling goes away on its own. Your doctor may want you to have blood or nerve tests to find the cause of your symptoms. Follow-up care is a key part of your treatment and safety. Be sure to make and go to all appointments, and call your doctor if you are having problems. It's also a good idea to know your test results and keep a list of the medicines you take. How can you care for yourself at home? · If your doctor prescribes medicine, take it exactly as directed. Call your doctor if you think you are having a problem with your medicine. · If you have any swelling, put ice or a cold pack on the area for 10 to 20 minutes at a time. Put a thin cloth between the ice and your skin. When should you call for help? Call 911 anytime you think you may need emergency care. For example, call if:    · You have weakness, numbness, or tingling in both legs.     · You lose bowel or bladder control.     · You have symptoms of a stroke. These may include:  ? Sudden numbness, tingling, weakness, or loss of movement in your face, arm, or leg, especially on only one side of your body. ? Sudden vision changes. ? Sudden trouble speaking. ? Sudden confusion or trouble understanding simple statements. ? Sudden problems with walking or balance. ? A sudden, severe headache that is different from past headaches.    Watch closely for changes in your health, and be sure to contact your doctor if you have any problems, or if:    · You do not get better as expected. Where can you learn more? Go to http://www.[employer].com/  Enter U128 in the search box to learn more about \"Numbness and Tingling: Care Instructions. \"  Current as of: December 13, 2021               Content Version: 13.2  © 4746-9974 [employer]. Care instructions adapted under license by [employer] (which disclaims liability or warranty for this information). If you have questions about a medical condition or this instruction, always ask your healthcare professional. [place] [address] any warranty or liability for your use of this information.

## 2022-06-02 NOTE — PROGRESS NOTES
Patient says she has been having numbness in her left leg and right arm occurring twice a month x 4 months. She also c/o head aches for days at a time x 4 month. 1. \"Have you been to the ER, urgent care clinic since your last visit? Hospitalized since your last visit? \" No    2. \"Have you seen or consulted any other health care providers outside of the [address] [place] [address] since your last visit? \" No     3. For patients aged 39-70: Has the patient had a colonoscopy / FIT/ Cologuard? NA - based on age      If the patient is female:    4. For patients aged 41-77: Has the patient had a mammogram within the past 2 years? NA - based on age or sex      11. For patients aged 21-65: Has the patient had a pap smear?  No

## 2022-06-02 NOTE — PROGRESS NOTES
Eliana Bean is a 28 y.o. female  presents for tingling in left lower leg. No history of trauma or injury. She has history of Dm in family. No Known Allergies    Patient Active Problem List   Diagnosis Code    Intractable migraine without aura and without status migrainosus G43.019    Palpitations R00.2     Past Medical History:   Diagnosis Date    Syncope      Social History     Socioeconomic History    Marital status: UNKNOWN   Tobacco Use    Smoking status: Former Smoker    Smokeless tobacco: Never Used   Vaping Use    Vaping Use: Never used   Substance and Sexual Activity    Alcohol use: Yes     Comment: Wine    Drug use: No    Sexual activity: Yes     Family History   Problem Relation Age of Onset    Heart Disease Mother     Hypertension Maternal Grandmother     Diabetes Maternal Grandfather     Heart Surgery Neg Hx     Stroke Neg Hx         Review of Systems   Constitutional: Negative for chills, fever, malaise/fatigue and weight loss. Eyes: Negative for blurred vision. Respiratory: Negative for cough, shortness of breath and wheezing. Cardiovascular: Negative for chest pain. Gastrointestinal: Negative for nausea and vomiting. Musculoskeletal: Negative for myalgias. Skin: Negative for rash. Neurological: Positive for tingling. Negative for weakness. Vitals:    06/02/22 1525   BP: 108/80   Pulse: 98   Resp: 10   Temp: 98.1 °F (36.7 °C)   TempSrc: Tympanic   SpO2: 98%   Weight: 184 lb (83.5 kg)   PainSc:   0 - No pain       Physical Exam  Vitals and nursing note reviewed. Neck:      Thyroid: No thyromegaly. Cardiovascular:      Rate and Rhythm: Normal rate and regular rhythm. Heart sounds: Normal heart sounds. Pulmonary:      Effort: Pulmonary effort is normal.      Breath sounds: Normal breath sounds. Musculoskeletal:         General: Normal range of motion. Cervical back: Normal range of motion and neck supple.    Skin:     General: Skin is warm and dry.      Capillary Refill: Capillary refill takes less than 2 seconds. Neurological:      General: No focal deficit present. Mental Status: She is alert and oriented to person, place, and time. Psychiatric:         Mood and Affect: Mood normal.         Behavior: Behavior normal.         Thought Content: Thought content normal.         Judgment: Judgment normal.         Assessment/Plan      ICD-10-CM ICD-9-CM    1. Vitamin D deficiency  E55.9 268.9 VITAMIN D, 25 HYDROXY   2. Tingling in extremities  K86.3 394.9 METABOLIC PANEL, COMPREHENSIVE      CBC WITH AUTOMATED DIFF      TSH AND FREE T4      HEMOGLOBIN A1C WITH EAG     Follow-up and Dispositions    · Return if symptoms worsen or fail to improve. I have discussed the diagnosis with the patient and the intended plan of care as seen in the above orders. The patient has received an after-visit summary and questions were answered concerning future plans. I have discussed medication, side effects, and warnings with the patient in detail. The patient verbalized understanding and is in agreement with the plan of care. The patient will contact the office with any additional concerns.       lab results and schedule of future lab studies reviewed with patient    Evie Stein MD

## 2022-06-15 ENCOUNTER — CLINICAL SUPPORT (OUTPATIENT)
Dept: FAMILY MEDICINE CLINIC | Age: 27
End: 2022-06-15
Payer: COMMERCIAL

## 2022-06-15 ENCOUNTER — HOSPITAL ENCOUNTER (OUTPATIENT)
Dept: LAB | Age: 27
Discharge: HOME | End: 2022-06-15

## 2022-06-15 DIAGNOSIS — R20.2 TINGLING IN EXTREMITIES: ICD-10-CM

## 2022-06-15 DIAGNOSIS — E55.9 VITAMIN D DEFICIENCY: Primary | ICD-10-CM

## 2022-06-15 LAB — XX-LABCORP SPECIMEN COL,LCBCF: NORMAL

## 2022-06-15 PROCEDURE — 99001 SPECIMEN HANDLING PT-LAB: CPT

## 2022-06-15 PROCEDURE — 36415 COLL VENOUS BLD VENIPUNCTURE: CPT | Performed by: FAMILY MEDICINE

## 2022-06-15 NOTE — PROGRESS NOTES
Patient here today for [place] lab draw name and  verified venipuncture performed on patients left arm was successful patient tolerated well.

## 2022-06-16 LAB
25(OH)D3+25(OH)D2 SERPL-MCNC: 32.7 NG/ML (ref 30–100)
ALBUMIN SERPL-MCNC: 4.3 G/DL (ref 3.9–5)
ALBUMIN/GLOB SERPL: 1.4 {RATIO} (ref 1.2–2.2)
ALP SERPL-CCNC: 54 IU/L (ref 44–121)
ALT SERPL-CCNC: 10 IU/L (ref 0–32)
AST SERPL-CCNC: 11 IU/L (ref 0–40)
BASOPHILS # BLD AUTO: 0.1 X10E3/UL (ref 0–0.2)
BASOPHILS NFR BLD AUTO: 1 %
BILIRUB SERPL-MCNC: <0.2 MG/DL (ref 0–1.2)
BUN SERPL-MCNC: 10 MG/DL (ref 6–20)
BUN/CREAT SERPL: 11 (ref 9–23)
CALCIUM SERPL-MCNC: 9.9 MG/DL (ref 8.7–10.2)
CHLORIDE SERPL-SCNC: 103 MMOL/L (ref 96–106)
CO2 SERPL-SCNC: 23 MMOL/L (ref 20–29)
CREAT SERPL-MCNC: 0.9 MG/DL (ref 0.57–1)
EGFR: 89 ML/MIN/1.73
EOSINOPHIL # BLD AUTO: 0.1 X10E3/UL (ref 0–0.4)
EOSINOPHIL NFR BLD AUTO: 1 %
ERYTHROCYTE [DISTWIDTH] IN BLOOD BY AUTOMATED COUNT: 12.9 % (ref 11.7–15.4)
EST. AVERAGE GLUCOSE BLD GHB EST-MCNC: 108 MG/DL
GLOBULIN SER CALC-MCNC: 3 G/DL (ref 1.5–4.5)
GLUCOSE SERPL-MCNC: 104 MG/DL (ref 65–99)
HBA1C MFR BLD: 5.4 % (ref 4.8–5.6)
HCT VFR BLD AUTO: 37.9 % (ref 34–46.6)
HGB BLD-MCNC: 12.3 G/DL (ref 11.1–15.9)
IMM GRANULOCYTES # BLD AUTO: 0 X10E3/UL (ref 0–0.1)
IMM GRANULOCYTES NFR BLD AUTO: 0 %
LYMPHOCYTES # BLD AUTO: 2.9 X10E3/UL (ref 0.7–3.1)
LYMPHOCYTES NFR BLD AUTO: 41 %
MCH RBC QN AUTO: 28.7 PG (ref 26.6–33)
MCHC RBC AUTO-ENTMCNC: 32.5 G/DL (ref 31.5–35.7)
MCV RBC AUTO: 89 FL (ref 79–97)
MONOCYTES # BLD AUTO: 0.4 X10E3/UL (ref 0.1–0.9)
MONOCYTES NFR BLD AUTO: 6 %
NEUTROPHILS # BLD AUTO: 3.7 X10E3/UL (ref 1.4–7)
NEUTROPHILS NFR BLD AUTO: 51 %
PLATELET # BLD AUTO: 339 X10E3/UL (ref 150–450)
POTASSIUM SERPL-SCNC: 4.7 MMOL/L (ref 3.5–5.2)
PROT SERPL-MCNC: 7.3 G/DL (ref 6–8.5)
RBC # BLD AUTO: 4.28 X10E6/UL (ref 3.77–5.28)
SODIUM SERPL-SCNC: 139 MMOL/L (ref 134–144)
T4 FREE SERPL-MCNC: 1.16 NG/DL (ref 0.82–1.77)
TSH SERPL DL<=0.005 MIU/L-ACNC: 3.3 UIU/ML (ref 0.45–4.5)
WBC # BLD AUTO: 7 X10E3/UL (ref 3.4–10.8)

## 2023-02-08 ENCOUNTER — HOSPITAL ENCOUNTER (OUTPATIENT)
Dept: LAB | Age: 28
Discharge: HOME | End: 2023-02-08

## 2023-02-08 ENCOUNTER — OFFICE VISIT (OUTPATIENT)
Dept: FAMILY MEDICINE CLINIC | Age: 28
End: 2023-02-08
Payer: COMMERCIAL

## 2023-02-08 VITALS
OXYGEN SATURATION: 98 % | BODY MASS INDEX: 32.78 KG/M2 | WEIGHT: 185 LBS | HEART RATE: 81 BPM | HEIGHT: 63 IN | RESPIRATION RATE: 12 BRPM | DIASTOLIC BLOOD PRESSURE: 80 MMHG | TEMPERATURE: 97.9 F | SYSTOLIC BLOOD PRESSURE: 130 MMHG

## 2023-02-08 DIAGNOSIS — E55.9 VITAMIN D DEFICIENCY: ICD-10-CM

## 2023-02-08 DIAGNOSIS — R55 NEAR SYNCOPE: Primary | ICD-10-CM

## 2023-02-08 DIAGNOSIS — L98.9 LEG LESION: ICD-10-CM

## 2023-02-08 LAB — XX-LABCORP SPECIMEN COL,LCBCF: NORMAL

## 2023-02-08 PROCEDURE — 99001 SPECIMEN HANDLING PT-LAB: CPT

## 2023-02-08 PROCEDURE — 99213 OFFICE O/P EST LOW 20 MIN: CPT | Performed by: FAMILY MEDICINE

## 2023-02-08 NOTE — PROGRESS NOTES
Chief Complaint   Patient presents with    Blood Pressure Check    Mass    Migraine         Patient here today with blood pressure concerns. She also has a \"knot\" on her right leg that is tender to touch x 3-4 weeks. She also has migraines she would like to discuss. 1. \"Have you been to the ER, urgent care clinic since your last visit? Hospitalized since your last visit? \"  [facility] toe nail infection    2. \"Have you seen or consulted any other health care providers outside of the [address] [place] [address] since your last visit? \" No     3. For patients aged 39-70: Has the patient had a colonoscopy / FIT/ Cologuard? NA - based on age      If the patient is female:    4. For patients aged 41-77: Has the patient had a mammogram within the past 2 years? NA - based on age or sex      11. For patients aged 21-65: Has the patient had a pap smear?  No

## 2023-02-08 NOTE — PROGRESS NOTES
Maria L Multani is a 28 y.o. female  presents for follow up after near syncope episode. No LOC. She also has area on leg that is tender at times. No assoc fever chills nausea or vomiting. No Known Allergies    Patient Active Problem List   Diagnosis Code    Intractable migraine without aura and without status migrainosus G43.019    Palpitations R00.2     Past Medical History:   Diagnosis Date    Syncope      Social History     Socioeconomic History    Marital status: UNKNOWN   Tobacco Use    Smoking status: Former    Smokeless tobacco: Never   Vaping Use    Vaping Use: Never used   Substance and Sexual Activity    Alcohol use: Yes     Comment: Wine    Drug use: No    Sexual activity: Yes     Family History   Problem Relation Age of Onset    Heart Disease Mother     Hypertension Maternal Grandmother     Diabetes Maternal Grandfather     Heart Surgery Neg Hx     Stroke Neg Hx         Review of Systems   Constitutional:  Negative for chills, fever, malaise/fatigue and weight loss. Eyes:  Negative for blurred vision. Respiratory:  Negative for cough, shortness of breath and wheezing. Cardiovascular:  Negative for chest pain. Gastrointestinal:  Negative for nausea and vomiting. Genitourinary: Negative. Musculoskeletal:  Negative for myalgias. Skin:  Negative for rash. Neurological:  Positive for dizziness. Negative for tingling, tremors, sensory change, speech change, focal weakness, seizures, loss of consciousness, weakness and headaches. Psychiatric/Behavioral: Negative. Vitals:    02/08/23 1131   BP: 130/80   Pulse: 81   Resp: 12   Temp: 97.9 °F (36.6 °C)   TempSrc: Tympanic   SpO2: 98%   Weight: 185 lb (83.9 kg)   Height: 5' 3\" (1.6 m)   PainSc:   0 - No pain       Physical Exam  Vitals and nursing note reviewed. Constitutional:       Appearance: Normal appearance. She is obese. Cardiovascular:      Rate and Rhythm: Normal rate and regular rhythm. Pulses: Normal pulses. Heart sounds: Normal heart sounds. Pulmonary:      Effort: Pulmonary effort is normal.      Breath sounds: Normal breath sounds. Musculoskeletal:         General: Normal range of motion. Cervical back: Normal range of motion and neck supple. Skin:     General: Skin is warm and dry. Capillary Refill: Capillary refill takes less than 2 seconds. Findings: Lesion (area on lower leg near ankle mildly tender no redness) present. Neurological:      General: No focal deficit present. Mental Status: She is alert and oriented to person, place, and time. Psychiatric:         Mood and Affect: Mood normal.         Behavior: Behavior normal.         Thought Content: Thought content normal.         Judgment: Judgment normal.       Assessment/Plan      ICD-10-CM ICD-9-CM    1. Near syncope  R55 780.2 REFERRAL TO CARDIOLOGY      METABOLIC PANEL, COMPREHENSIVE      TSH AND FREE T4      CBC WITH AUTOMATED DIFF      2. Vitamin D deficiency  E55.9 268.9 VITAMIN D, 25 HYDROXY      3. Leg lesion  L98.9 709.9 Probable cyst will recheck at follow up. Follow-up and Dispositions    Return in about 3 weeks (around 3/1/2023). I have discussed the diagnosis with the patient and the intended plan of care as seen in the above orders. The patient has received an after-visit summary and questions were answered concerning future plans. I have discussed medication, side effects, and warnings with the patient in detail. The patient verbalized understanding and is in agreement with the plan of care. The patient will contact the office with any additional concerns.     lab results and schedule of future lab studies reviewed with patient    Brennen Rosales MD

## 2023-02-09 LAB
25(OH)D3+25(OH)D2 SERPL-MCNC: 20.9 NG/ML (ref 30–100)
ALBUMIN SERPL-MCNC: 4.3 G/DL (ref 3.9–5)
ALBUMIN/GLOB SERPL: 1.3 {RATIO} (ref 1.2–2.2)
ALP SERPL-CCNC: 52 IU/L (ref 44–121)
ALT SERPL-CCNC: 12 IU/L (ref 0–32)
AST SERPL-CCNC: 13 IU/L (ref 0–40)
BASOPHILS # BLD AUTO: 0.1 X10E3/UL (ref 0–0.2)
BASOPHILS NFR BLD AUTO: 1 %
BILIRUB SERPL-MCNC: 0.3 MG/DL (ref 0–1.2)
BUN SERPL-MCNC: 9 MG/DL (ref 6–20)
BUN/CREAT SERPL: 11 (ref 9–23)
CALCIUM SERPL-MCNC: 9.9 MG/DL (ref 8.7–10.2)
CHLORIDE SERPL-SCNC: 100 MMOL/L (ref 96–106)
CO2 SERPL-SCNC: 25 MMOL/L (ref 20–29)
CREAT SERPL-MCNC: 0.84 MG/DL (ref 0.57–1)
EGFRCR SERPLBLD CKD-EPI 2021: 97 ML/MIN/1.73
EOSINOPHIL # BLD AUTO: 0.1 X10E3/UL (ref 0–0.4)
EOSINOPHIL NFR BLD AUTO: 1 %
ERYTHROCYTE [DISTWIDTH] IN BLOOD BY AUTOMATED COUNT: 13.3 % (ref 11.7–15.4)
GLOBULIN SER CALC-MCNC: 3.3 G/DL (ref 1.5–4.5)
GLUCOSE SERPL-MCNC: 91 MG/DL (ref 70–99)
HCT VFR BLD AUTO: 38.7 % (ref 34–46.6)
HGB BLD-MCNC: 12.6 G/DL (ref 11.1–15.9)
IMM GRANULOCYTES # BLD AUTO: 0 X10E3/UL (ref 0–0.1)
IMM GRANULOCYTES NFR BLD AUTO: 0 %
LYMPHOCYTES # BLD AUTO: 2.8 X10E3/UL (ref 0.7–3.1)
LYMPHOCYTES NFR BLD AUTO: 41 %
MCH RBC QN AUTO: 28.1 PG (ref 26.6–33)
MCHC RBC AUTO-ENTMCNC: 32.6 G/DL (ref 31.5–35.7)
MCV RBC AUTO: 86 FL (ref 79–97)
MONOCYTES # BLD AUTO: 0.5 X10E3/UL (ref 0.1–0.9)
MONOCYTES NFR BLD AUTO: 7 %
NEUTROPHILS # BLD AUTO: 3.5 X10E3/UL (ref 1.4–7)
NEUTROPHILS NFR BLD AUTO: 50 %
PLATELET # BLD AUTO: 305 X10E3/UL (ref 150–450)
POTASSIUM SERPL-SCNC: 5.1 MMOL/L (ref 3.5–5.2)
PROT SERPL-MCNC: 7.6 G/DL (ref 6–8.5)
RBC # BLD AUTO: 4.49 X10E6/UL (ref 3.77–5.28)
SODIUM SERPL-SCNC: 137 MMOL/L (ref 134–144)
T4 FREE SERPL-MCNC: 1.1 NG/DL (ref 0.82–1.77)
TSH SERPL DL<=0.005 MIU/L-ACNC: 1.86 UIU/ML (ref 0.45–4.5)
WBC # BLD AUTO: 6.9 X10E3/UL (ref 3.4–10.8)

## 2023-02-14 DIAGNOSIS — E55.9 VITAMIN D DEFICIENCY: Primary | ICD-10-CM

## 2023-02-14 RX ORDER — ERGOCALCIFEROL 1.25 MG/1
50000 CAPSULE ORAL WEEKLY
Qty: 4 CAPSULE | Refills: 3 | Status: SHIPPED | OUTPATIENT
Start: 2023-02-14

## 2023-02-17 ENCOUNTER — TELEPHONE (OUTPATIENT)
Facility: CLINIC | Age: 28
End: 2023-02-17

## 2023-02-17 NOTE — TELEPHONE ENCOUNTER
Patient called the office back and has been made aware of her results as well as her need for supplement.

## 2023-02-17 NOTE — TELEPHONE ENCOUNTER
Called patient to discuss her lab results no answer left message asking that she call the office back, office number on her VM.